# Patient Record
Sex: MALE | Race: BLACK OR AFRICAN AMERICAN | NOT HISPANIC OR LATINO | Employment: FULL TIME | URBAN - METROPOLITAN AREA
[De-identification: names, ages, dates, MRNs, and addresses within clinical notes are randomized per-mention and may not be internally consistent; named-entity substitution may affect disease eponyms.]

---

## 2023-02-02 PROBLEM — L98.9 SKIN LESION: Status: ACTIVE | Noted: 2023-02-02

## 2023-02-02 PROBLEM — I73.9 CLAUDICATION, INTERMITTENT (CMS-HCC): Status: ACTIVE | Noted: 2023-02-02

## 2023-02-02 PROBLEM — I26.99 PULMONARY EMBOLISM, BILATERAL (MULTI): Status: ACTIVE | Noted: 2023-02-02

## 2023-02-02 PROBLEM — I10 HYPERTENSION: Status: ACTIVE | Noted: 2023-02-02

## 2023-02-02 PROBLEM — I82.503: Status: ACTIVE | Noted: 2023-02-02

## 2023-02-02 PROBLEM — I87.2 VENOUS INSUFFICIENCY: Status: ACTIVE | Noted: 2023-02-02

## 2023-02-02 PROBLEM — I87.1 ILIAC VEIN STENOSIS, LEFT: Status: ACTIVE | Noted: 2023-02-02

## 2023-02-02 PROBLEM — E66.9 CLASS 2 OBESITY WITH BODY MASS INDEX (BMI) OF 36.0 TO 36.9 IN ADULT: Status: ACTIVE | Noted: 2023-02-02

## 2023-02-02 PROBLEM — R94.31 ABNORMAL EKG: Status: ACTIVE | Noted: 2023-02-02

## 2023-02-02 PROBLEM — E66.812 CLASS 2 OBESITY WITH BODY MASS INDEX (BMI) OF 36.0 TO 36.9 IN ADULT: Status: ACTIVE | Noted: 2023-02-02

## 2023-02-02 PROBLEM — R52 PAIN: Status: ACTIVE | Noted: 2023-02-02

## 2023-02-02 PROBLEM — Z04.9 CONDITION NOT FOUND: Status: ACTIVE | Noted: 2023-02-02

## 2023-02-02 PROBLEM — R60.9 EDEMA: Status: ACTIVE | Noted: 2023-02-02

## 2023-02-02 RX ORDER — AMLODIPINE BESYLATE 5 MG/1
1 TABLET ORAL DAILY
COMMUNITY
Start: 2022-07-07 | End: 2023-11-13

## 2023-02-02 RX ORDER — WARFARIN SODIUM 5 MG/1
TABLET ORAL
COMMUNITY
Start: 2022-04-11 | End: 2024-03-12 | Stop reason: SDUPTHER

## 2023-02-02 RX ORDER — HYDROCHLOROTHIAZIDE 25 MG/1
1 TABLET ORAL DAILY
COMMUNITY
End: 2024-02-07

## 2023-02-02 RX ORDER — METHOCARBAMOL 750 MG/1
1 TABLET, FILM COATED ORAL NIGHTLY
COMMUNITY
Start: 2022-05-09 | End: 2023-06-16 | Stop reason: SDUPTHER

## 2023-02-02 RX ORDER — NIACIN (INOSITOL NIACINATE) 400(500MG)
1 CAPSULE ORAL 2 TIMES DAILY
COMMUNITY
Start: 2022-12-10 | End: 2023-06-16 | Stop reason: ALTCHOICE

## 2023-03-16 ENCOUNTER — OFFICE VISIT (OUTPATIENT)
Dept: PRIMARY CARE | Facility: CLINIC | Age: 48
End: 2023-03-16
Payer: COMMERCIAL

## 2023-03-16 VITALS
BODY MASS INDEX: 35.87 KG/M2 | WEIGHT: 242.2 LBS | HEIGHT: 69 IN | SYSTOLIC BLOOD PRESSURE: 146 MMHG | DIASTOLIC BLOOD PRESSURE: 92 MMHG | HEART RATE: 78 BPM | OXYGEN SATURATION: 97 %

## 2023-03-16 DIAGNOSIS — R73.9 HYPERGLYCEMIA: ICD-10-CM

## 2023-03-16 DIAGNOSIS — D75.1 POLYCYTHEMIA: ICD-10-CM

## 2023-03-16 DIAGNOSIS — R06.83 SNORING: ICD-10-CM

## 2023-03-16 DIAGNOSIS — I73.9 CLAUDICATION, INTERMITTENT (CMS-HCC): ICD-10-CM

## 2023-03-16 DIAGNOSIS — Z12.11 SCREENING FOR COLON CANCER: ICD-10-CM

## 2023-03-16 DIAGNOSIS — I10 HYPERTENSION, UNSPECIFIED TYPE: Primary | ICD-10-CM

## 2023-03-16 DIAGNOSIS — I82.503 CHRONIC VENOUS EMBOLISM AND THROMBOSIS OF DEEP VESSELS OF BOTH LOWER EXTREMITIES (MULTI): ICD-10-CM

## 2023-03-16 PROBLEM — I26.99 PULMONARY EMBOLISM, BILATERAL (MULTI): Status: RESOLVED | Noted: 2023-02-02 | Resolved: 2023-03-16

## 2023-03-16 PROBLEM — E66.812 CLASS 2 OBESITY WITH BODY MASS INDEX (BMI) OF 36.0 TO 36.9 IN ADULT: Status: RESOLVED | Noted: 2023-02-02 | Resolved: 2023-03-16

## 2023-03-16 PROBLEM — E66.9 CLASS 2 OBESITY WITH BODY MASS INDEX (BMI) OF 36.0 TO 36.9 IN ADULT: Status: RESOLVED | Noted: 2023-02-02 | Resolved: 2023-03-16

## 2023-03-16 PROCEDURE — 99214 OFFICE O/P EST MOD 30 MIN: CPT | Performed by: PHYSICIAN ASSISTANT

## 2023-03-16 PROCEDURE — 3080F DIAST BP >= 90 MM HG: CPT | Performed by: PHYSICIAN ASSISTANT

## 2023-03-16 PROCEDURE — 3077F SYST BP >= 140 MM HG: CPT | Performed by: PHYSICIAN ASSISTANT

## 2023-03-16 PROCEDURE — 1036F TOBACCO NON-USER: CPT | Performed by: PHYSICIAN ASSISTANT

## 2023-03-16 ASSESSMENT — PATIENT HEALTH QUESTIONNAIRE - PHQ9
1. LITTLE INTEREST OR PLEASURE IN DOING THINGS: NOT AT ALL
SUM OF ALL RESPONSES TO PHQ9 QUESTIONS 1 AND 2: 0
2. FEELING DOWN, DEPRESSED OR HOPELESS: NOT AT ALL

## 2023-03-16 NOTE — ASSESSMENT & PLAN NOTE
Continue warfarin 5 mg.  Follow with hematology for PT/INR checks.  Report any abnormal bleeding or bruising.

## 2023-03-16 NOTE — ASSESSMENT & PLAN NOTE
BP above goal of <130/80.  Increase amlodipine from 2.5 mg to 5 mg.  Encourage strict DASH diet and exercise as tolerated.  Encourage patient to buy Omron BP cuff and begin checking at home twice daily.  Follow with cardiology

## 2023-03-16 NOTE — PROGRESS NOTES
"Subjective   Patient ID: Brett Carrasco is a 47 y.o. male who presents for Follow-up (States dx with polycythemia).    HPI 47 year old male presenting for establishment of care. Former patient of colleague, Dr Ledbetter, last seen 12/9/2022.     HTN: Managed with amlodipine 2.5mg and hydrochlorothiazide 25mg. Does not take BP at home. Denies headaches, dizziness, CP, SOB/VILLEGAS, leg swelling. Follows with cardiology, Dr Barboza. Last seen 12/28/23.    C/o of intermittent L upper side pain that is sharp in nature. States it lasts a few seconds. Denies known triggers.     Intermittent claudication/venous insufficiency: Hx of chronic DVT of b/l extremities and PEs. Managed with warfarin 5mg and Coenzyme Q-10. Previously intolerant to Cilostazol 100mg BID d/t headaches. Denies calf pain, redness, swelling, or abnormal bleeding. Last INR 3.1 (1/13/23). States methocarbamol helps with leg cramping and pain. Follows with cardiology and hematology.     Knee pain: Daily R medial knee pain x 1.5 months. Mostly aching in nature, has been sharp before. Worse with walking. Denies redness, swelling, warmth, recent injury.    Requesting sleep study: Known to snore. Has not been told he stops breathing his sleeps. Denies excessive daytime sleepiness. Recently diagnosed with polycythemia. Hematologist recommends sleep study to investigate causes of polycythemia. Patient also states he was told during a hospital stay he has sleep apnea.      Health maintenance  Immunizations:  -Flu: Refused  -Covid: received 2 dose   PSA: UTD (last 9/1/22)  Colon cancer screening: [facility] and [facility]    Objective   BP (!) 146/92   Pulse 78   Ht 1.753 m (5' 9\")   Wt 110 kg (242 lb 3.2 oz)   SpO2 97%   BMI 35.77 kg/m²     Physical Exam  Vitals reviewed.   Constitutional:       General: He is not in acute distress.     Appearance: Normal appearance. He is not ill-appearing.   HENT:      Head: Normocephalic and atraumatic.   Eyes:      General: No scleral " icterus.     Extraocular Movements: Extraocular movements intact.      Conjunctiva/sclera: Conjunctivae normal.      Pupils: Pupils are equal, round, and reactive to light.   Cardiovascular:      Rate and Rhythm: Normal rate and regular rhythm.      Heart sounds: Normal heart sounds. No murmur heard.     No friction rub. No gallop.   Pulmonary:      Effort: Pulmonary effort is normal. No respiratory distress.      Breath sounds: Normal breath sounds. No stridor. No wheezing, rhonchi or rales.   Musculoskeletal:      Cervical back: Normal range of motion.      Right lower leg: No edema.      Left lower leg: No edema.   Skin:     General: Skin is warm and dry.   Neurological:      Mental Status: He is alert and oriented to person, place, and time. Mental status is at baseline.      Cranial Nerves: No cranial nerve deficit.      Gait: Gait normal.   Psychiatric:         Mood and Affect: Mood normal.         Behavior: Behavior normal.         Assessment/Plan   Problem List Items Addressed This Visit       Chronic venous embolism and thrombosis of deep vessels of both lower extremities (CMS/HCC)     Continue warfarin 5 mg.  Follow with hematology for PT/INR checks.  Report any abnormal bleeding or bruising.         Claudication, intermittent (CMS/HCC)     Continue CoQ-10. Follow with vascular surgery.          Relevant Orders    Lipid panel    Hypertension - Primary     BP above goal of <130/80.  Increase amlodipine from 2.5 mg to 5 mg.  Encourage strict DASH diet and exercise as tolerated.  Encourage patient to buy Omron BP cuff and begin checking at home twice daily.  Follow with cardiology         Polycythemia     Continue following with hematology.  Will order sleep study as requested.  Labs per hematology.  Encouraged deep breathing exercises and increase water intake.         Relevant Orders    In-Center Sleep Study (Non-Sleep Provider Only)     Other Visit Diagnoses       Hyperglycemia        Relevant Orders     Hemoglobin A1c    Snoring        Relevant Orders    In-Center Sleep Study (Non-Sleep Provider Only)    Screening for colon cancer        Relevant Orders    Colonoscopy

## 2023-03-16 NOTE — ASSESSMENT & PLAN NOTE
Continue following with hematology.  Will order sleep study as requested.  Labs per hematology.  Encouraged deep breathing exercises and increase water intake.

## 2023-03-29 ENCOUNTER — LAB (OUTPATIENT)
Dept: LAB | Facility: LAB | Age: 48
End: 2023-03-29
Payer: COMMERCIAL

## 2023-03-29 DIAGNOSIS — R73.9 HYPERGLYCEMIA: ICD-10-CM

## 2023-03-29 DIAGNOSIS — I73.9 CLAUDICATION, INTERMITTENT (CMS-HCC): ICD-10-CM

## 2023-03-29 PROCEDURE — 80061 LIPID PANEL: CPT

## 2023-03-29 PROCEDURE — 83036 HEMOGLOBIN GLYCOSYLATED A1C: CPT

## 2023-03-29 PROCEDURE — 36415 COLL VENOUS BLD VENIPUNCTURE: CPT

## 2023-03-30 LAB
CHOLESTEROL (MG/DL) IN SER/PLAS: 217 MG/DL (ref 0–199)
CHOLESTEROL IN HDL (MG/DL) IN SER/PLAS: 38.1 MG/DL
CHOLESTEROL/HDL RATIO: 5.7
ESTIMATED AVERAGE GLUCOSE FOR HBA1C: 174 MG/DL
HEMOGLOBIN A1C/HEMOGLOBIN TOTAL IN BLOOD: 7.7 %
LDL: 133 MG/DL (ref 0–99)
NON HDL CHOLESTEROL: 179 MG/DL
TRIGLYCERIDE (MG/DL) IN SER/PLAS: 232 MG/DL (ref 0–149)
VLDL: 46 MG/DL (ref 0–40)

## 2023-03-31 NOTE — RESULT ENCOUNTER NOTE
Labs show:  Lipid panel shows borderline high total cholesterol and LDL (bad cholesterol), high triglycerides, and low HDL (good cholesterol).  Please verify if he is taking any statin medications for his cholesterol.  If not, we should consider starting one.     Hemoglobin A1c increased from 6.4% to 7.7%.  He needs to begin a diabetic medication. I would like to call over prescription for Farxiga to the pharmacy. If it is not covered they will notify the office and then we will determine a more affordable option.  Possible side effects with this medication are urinary tract infections, yeast infections, GI upset, dehydration.  As always, he needs to cut back on any carbohydrates or sugary sweets/drinks in his diet.  Increase exercise level as tolerated

## 2023-04-03 DIAGNOSIS — E78.5 HYPERLIPIDEMIA, UNSPECIFIED HYPERLIPIDEMIA TYPE: ICD-10-CM

## 2023-04-03 DIAGNOSIS — E11.9 TYPE 2 DIABETES MELLITUS WITHOUT COMPLICATION, WITHOUT LONG-TERM CURRENT USE OF INSULIN (MULTI): ICD-10-CM

## 2023-04-03 DIAGNOSIS — M10.9 GOUT, UNSPECIFIED CAUSE, UNSPECIFIED CHRONICITY, UNSPECIFIED SITE: Primary | ICD-10-CM

## 2023-04-03 RX ORDER — METFORMIN HYDROCHLORIDE 500 MG/1
500 TABLET ORAL
Qty: 60 TABLET | Refills: 2 | Status: SHIPPED | OUTPATIENT
Start: 2023-04-03 | End: 2023-04-28

## 2023-04-03 RX ORDER — ALLOPURINOL 100 MG/1
100 TABLET ORAL DAILY
Qty: 30 TABLET | Refills: 2 | Status: SHIPPED | OUTPATIENT
Start: 2023-04-03 | End: 2023-04-28

## 2023-04-03 RX ORDER — ATORVASTATIN CALCIUM 20 MG/1
20 TABLET, FILM COATED ORAL DAILY
Qty: 30 TABLET | Refills: 2 | Status: SHIPPED | OUTPATIENT
Start: 2023-04-03 | End: 2023-06-16 | Stop reason: ALTCHOICE

## 2023-04-04 ENCOUNTER — TELEPHONE (OUTPATIENT)
Dept: PRIMARY CARE | Facility: CLINIC | Age: 48
End: 2023-04-04
Payer: COMMERCIAL

## 2023-04-04 NOTE — TELEPHONE ENCOUNTER
Pt informed of results.    ----- Message from Trina Adam PA-C sent at 3/31/2023  2:55 PM EDT -----  Labs show:  Lipid panel shows borderline high total cholesterol and LDL (bad cholesterol), high triglycerides, and low HDL (good cholesterol).  Please verify if he is taking any statin medications for his cholesterol.  If not, we should consider starting one.     Hemoglobin A1c increased from 6.4% to 7.7%.  He needs to begin a diabetic medication. I would like to call over prescription for Farxiga to the pharmacy. If it is not covered they will notify the office and then we will determine a more affordable option.  Possible side effects with this medication are urinary tract infections, yeast infections, GI upset, dehydration.  As always, he needs to cut back on any carbohydrates or sugary sweets/drinks in his diet.  Increase exercise level as tolerated

## 2023-04-28 DIAGNOSIS — M10.9 GOUT, UNSPECIFIED CAUSE, UNSPECIFIED CHRONICITY, UNSPECIFIED SITE: ICD-10-CM

## 2023-04-28 DIAGNOSIS — E11.9 TYPE 2 DIABETES MELLITUS WITHOUT COMPLICATION, WITHOUT LONG-TERM CURRENT USE OF INSULIN (MULTI): ICD-10-CM

## 2023-04-28 RX ORDER — ALLOPURINOL 100 MG/1
100 TABLET ORAL DAILY
Qty: 30 TABLET | Refills: 2 | Status: SHIPPED | OUTPATIENT
Start: 2023-04-28 | End: 2023-07-24

## 2023-04-28 RX ORDER — METFORMIN HYDROCHLORIDE 500 MG/1
TABLET ORAL
Qty: 60 TABLET | Refills: 2 | Status: SHIPPED | OUTPATIENT
Start: 2023-04-28 | End: 2023-06-16 | Stop reason: SDUPTHER

## 2023-05-10 ENCOUNTER — HOSPITAL ENCOUNTER (OUTPATIENT)
Dept: DATA CONVERSION | Facility: HOSPITAL | Age: 48
End: 2023-05-10
Attending: STUDENT IN AN ORGANIZED HEALTH CARE EDUCATION/TRAINING PROGRAM | Admitting: STUDENT IN AN ORGANIZED HEALTH CARE EDUCATION/TRAINING PROGRAM
Payer: COMMERCIAL

## 2023-05-10 DIAGNOSIS — D12.0 BENIGN NEOPLASM OF CECUM: ICD-10-CM

## 2023-05-10 DIAGNOSIS — Z12.11 ENCOUNTER FOR SCREENING FOR MALIGNANT NEOPLASM OF COLON: ICD-10-CM

## 2023-05-10 DIAGNOSIS — K57.30 DIVERTICULOSIS OF LARGE INTESTINE WITHOUT PERFORATION OR ABSCESS WITHOUT BLEEDING: ICD-10-CM

## 2023-05-10 LAB — POCT GLUCOSE: 130 MG/DL (ref 74–99)

## 2023-05-16 LAB
COMPLETE PATHOLOGY REPORT: NORMAL
CONVERTED CLINICAL DIAGNOSIS-HISTORY: NORMAL
CONVERTED FINAL DIAGNOSIS: NORMAL
CONVERTED FINAL REPORT PDF LINK TO COPY AND PASTE: NORMAL
CONVERTED GROSS DESCRIPTION: NORMAL

## 2023-06-14 ENCOUNTER — LAB (OUTPATIENT)
Dept: LAB | Facility: LAB | Age: 48
End: 2023-06-14
Payer: COMMERCIAL

## 2023-06-14 DIAGNOSIS — E78.5 HYPERLIPIDEMIA, UNSPECIFIED HYPERLIPIDEMIA TYPE: ICD-10-CM

## 2023-06-14 LAB
ALANINE AMINOTRANSFERASE (SGPT) (U/L) IN SER/PLAS: 21 U/L (ref 10–52)
ALBUMIN (G/DL) IN SER/PLAS: 4.6 G/DL (ref 3.4–5)
ALKALINE PHOSPHATASE (U/L) IN SER/PLAS: 55 U/L (ref 33–120)
ASPARTATE AMINOTRANSFERASE (SGOT) (U/L) IN SER/PLAS: 18 U/L (ref 9–39)
BILIRUBIN DIRECT (MG/DL) IN SER/PLAS: 0.1 MG/DL (ref 0–0.3)
BILIRUBIN TOTAL (MG/DL) IN SER/PLAS: 0.6 MG/DL (ref 0–1.2)
CHOLESTEROL (MG/DL) IN SER/PLAS: 205 MG/DL (ref 0–199)
CHOLESTEROL IN HDL (MG/DL) IN SER/PLAS: 36.6 MG/DL
CHOLESTEROL/HDL RATIO: 5.6
LDL: 121 MG/DL (ref 0–99)
NON HDL CHOLESTEROL: 168 MG/DL
PROTEIN TOTAL: 7.7 G/DL (ref 6.4–8.2)
TRIGLYCERIDE (MG/DL) IN SER/PLAS: 238 MG/DL (ref 0–149)
VLDL: 48 MG/DL (ref 0–40)

## 2023-06-14 PROCEDURE — 36415 COLL VENOUS BLD VENIPUNCTURE: CPT

## 2023-06-14 PROCEDURE — 80061 LIPID PANEL: CPT

## 2023-06-14 PROCEDURE — 80076 HEPATIC FUNCTION PANEL: CPT

## 2023-06-15 NOTE — RESULT ENCOUNTER NOTE
Lab results are back.    Lipid panel shows high total cholesterol, LDL, and triglycerides.  Good cholesterol level is also low.  Please confirm that he is taking atorvastatin 20 mg consistently.  If so, I recommend increasing it to 40 mg to see if there is a better improvement in his cholesterol.  Additionally, he should cut back on any saturated fats, bread, potatoes, pastas.  Increase exercise level to 30 minutes daily as tolerated    Liver enzymes normal

## 2023-06-16 ENCOUNTER — OFFICE VISIT (OUTPATIENT)
Dept: PRIMARY CARE | Facility: CLINIC | Age: 48
End: 2023-06-16
Payer: COMMERCIAL

## 2023-06-16 VITALS
SYSTOLIC BLOOD PRESSURE: 119 MMHG | BODY MASS INDEX: 34.56 KG/M2 | OXYGEN SATURATION: 98 % | DIASTOLIC BLOOD PRESSURE: 80 MMHG | WEIGHT: 234 LBS | HEART RATE: 67 BPM

## 2023-06-16 DIAGNOSIS — I10 PRIMARY HYPERTENSION: Primary | ICD-10-CM

## 2023-06-16 DIAGNOSIS — H60.501 ACUTE OTITIS EXTERNA OF RIGHT EAR, UNSPECIFIED TYPE: ICD-10-CM

## 2023-06-16 DIAGNOSIS — M10.9 GOUT, UNSPECIFIED CAUSE, UNSPECIFIED CHRONICITY, UNSPECIFIED SITE: ICD-10-CM

## 2023-06-16 DIAGNOSIS — E11.9 TYPE 2 DIABETES MELLITUS WITHOUT COMPLICATION, WITHOUT LONG-TERM CURRENT USE OF INSULIN (MULTI): ICD-10-CM

## 2023-06-16 DIAGNOSIS — R25.2 MUSCLE CRAMPS: ICD-10-CM

## 2023-06-16 PROBLEM — E66.811 OBESITY, CLASS I, BMI 30-34.9: Status: ACTIVE | Noted: 2021-07-14

## 2023-06-16 PROBLEM — D68.62 LUPUS ANTICOAGULANT DISORDER (MULTI): Chronic | Status: ACTIVE | Noted: 2021-07-13

## 2023-06-16 PROBLEM — F17.200 NICOTINE USE DISORDER: Status: ACTIVE | Noted: 2021-07-14

## 2023-06-16 PROBLEM — E66.9 OBESITY, CLASS I, BMI 30-34.9: Status: ACTIVE | Noted: 2021-07-14

## 2023-06-16 PROBLEM — R07.89 OTHER CHEST PAIN: Status: ACTIVE | Noted: 2021-07-13

## 2023-06-16 PROBLEM — Z86.718 HISTORY OF DVT OF LOWER EXTREMITY: Status: ACTIVE | Noted: 2021-07-13

## 2023-06-16 PROBLEM — E66.812 OBESITY, CLASS II, BMI 35-39.9: Status: ACTIVE | Noted: 2021-11-29

## 2023-06-16 PROBLEM — E66.9 OBESITY, CLASS II, BMI 35-39.9: Status: ACTIVE | Noted: 2021-11-29

## 2023-06-16 PROBLEM — I27.82 CHRONIC PULMONARY EMBOLISM WITHOUT ACUTE COR PULMONALE (MULTI): Chronic | Status: ACTIVE | Noted: 2021-07-13

## 2023-06-16 PROCEDURE — 3051F HG A1C>EQUAL 7.0%<8.0%: CPT | Performed by: PHYSICIAN ASSISTANT

## 2023-06-16 PROCEDURE — 1036F TOBACCO NON-USER: CPT | Performed by: PHYSICIAN ASSISTANT

## 2023-06-16 PROCEDURE — 3074F SYST BP LT 130 MM HG: CPT | Performed by: PHYSICIAN ASSISTANT

## 2023-06-16 PROCEDURE — 3079F DIAST BP 80-89 MM HG: CPT | Performed by: PHYSICIAN ASSISTANT

## 2023-06-16 PROCEDURE — 99214 OFFICE O/P EST MOD 30 MIN: CPT | Performed by: PHYSICIAN ASSISTANT

## 2023-06-16 RX ORDER — CLOTRIMAZOLE AND BETAMETHASONE DIPROPIONATE 10; .5 MG/ML; MG/ML
LOTION TOPICAL
COMMUNITY
Start: 2014-04-12 | End: 2023-06-16 | Stop reason: ALTCHOICE

## 2023-06-16 RX ORDER — AMLODIPINE BESYLATE 2.5 MG/1
1 TABLET ORAL DAILY
COMMUNITY
Start: 2022-09-13 | End: 2023-06-16 | Stop reason: ALTCHOICE

## 2023-06-16 RX ORDER — FLUCONAZOLE 150 MG/1
TABLET ORAL
COMMUNITY
Start: 2014-04-12 | End: 2023-06-16 | Stop reason: ALTCHOICE

## 2023-06-16 RX ORDER — COLCHICINE 0.6 MG/1
0.6 TABLET ORAL DAILY
COMMUNITY
Start: 2023-04-27 | End: 2023-06-16 | Stop reason: ALTCHOICE

## 2023-06-16 RX ORDER — METHOCARBAMOL 750 MG/1
750 TABLET, FILM COATED ORAL NIGHTLY
Qty: 90 TABLET | Refills: 1 | Status: SHIPPED | OUTPATIENT
Start: 2023-06-16 | End: 2024-04-24

## 2023-06-16 RX ORDER — ENOXAPARIN SODIUM 100 MG/ML
INJECTION SUBCUTANEOUS
COMMUNITY
Start: 2023-04-27 | End: 2023-06-16 | Stop reason: ALTCHOICE

## 2023-06-16 RX ORDER — ATORVASTATIN CALCIUM 10 MG/1
10 TABLET, FILM COATED ORAL
COMMUNITY
End: 2023-06-16 | Stop reason: ALTCHOICE

## 2023-06-16 RX ORDER — NEOMYCIN SULFATE, POLYMYXIN B SULFATE, HYDROCORTISONE 3.5; 10000; 1 MG/ML; [USP'U]/ML; MG/ML
2 SOLUTION/ DROPS AURICULAR (OTIC) 4 TIMES DAILY
Qty: 10 ML | Refills: 0 | Status: SHIPPED | OUTPATIENT
Start: 2023-06-16 | End: 2023-10-31 | Stop reason: SDUPTHER

## 2023-06-16 RX ORDER — ACETAMINOPHEN 160 MG/5ML
200 SUSPENSION, ORAL (FINAL DOSE FORM) ORAL
COMMUNITY

## 2023-06-16 RX ORDER — HYDROCODONE BITARTRATE AND ACETAMINOPHEN 5; 325 MG/1; MG/1
1 TABLET ORAL
COMMUNITY
End: 2023-06-16 | Stop reason: ALTCHOICE

## 2023-06-16 RX ORDER — METHOCARBAMOL 750 MG/1
750 TABLET, FILM COATED ORAL EVERY 8 HOURS PRN
COMMUNITY
Start: 2022-06-30 | End: 2023-06-16

## 2023-06-16 RX ORDER — METFORMIN HYDROCHLORIDE 500 MG/1
500 TABLET ORAL
Qty: 180 TABLET | Refills: 0 | Status: SHIPPED | OUTPATIENT
Start: 2023-06-16 | End: 2023-11-13 | Stop reason: SDUPTHER

## 2023-06-16 NOTE — PROGRESS NOTES
Subjective   Brett Carrasco is a 47 y.o. male who presents for 3month f/u and ear, pain, refill on pain med.  HPI Brett Carrasco is a 47 y.o. male presenting for a follow up. Generally doing well. Currently c/o:    Preauricular pain: tried to clean his ears with peroxide and now has irritation of the right ear. No tinnitus or hearing changes. No pain with manipulation of the auricle. No discharge from the ear.     Muscle cramping: chronic. Was told by hematologist and cardiologist that it is 2/2 to venous stasis, PAD (w/ claudication), polycythemia, and lupus anticoagulant.  Doesn't do any topical pain relief. Methocarbamol helps the most. Resolve w/in 15 mins of taking    HTN, HLD: Managed with amlodipine 5mg and hydrochlorothiazide 25mg. Does not take BP at home. Denies headaches, dizziness, CP, SOB/VILLEGAS, leg swelling. Follows with cardiology, appt in 1 week.     T2DM: started on Metformin 500mg twice daily, tolerating it well without side effects. He does not check BG levels at home.     Gout: on allopurinol 100mg, stable. No gout flares since initiation of allopurinol.     12 point ROS reviewed and negative other than as stated in HPI    /80   Pulse 67   Wt 106 kg (234 lb)   SpO2 98%   BMI 34.56 kg/m²   Objective   Physical Exam  Vitals reviewed.   Constitutional:       General: He is not in acute distress.     Appearance: Normal appearance. He is not ill-appearing.   HENT:      Head: Normocephalic and atraumatic.      Right Ear: Tympanic membrane normal. Swelling (of the EAC) present. No tenderness. No middle ear effusion. There is no impacted cerumen. No foreign body. No mastoid tenderness.      Ears:      Comments: Erythema of the right EAC.   Eyes:      General: No scleral icterus.     Extraocular Movements: Extraocular movements intact.      Conjunctiva/sclera: Conjunctivae normal.      Pupils: Pupils are equal, round, and reactive to light.   Cardiovascular:      Rate and Rhythm: Normal rate and regular  rhythm.      Heart sounds: Normal heart sounds. No murmur heard.     No friction rub. No gallop.   Pulmonary:      Effort: Pulmonary effort is normal. No respiratory distress.      Breath sounds: Normal breath sounds. No stridor. No wheezing, rhonchi or rales.   Musculoskeletal:      Cervical back: Normal range of motion.      Right lower leg: No edema.      Left lower leg: No edema.   Skin:     General: Skin is warm and dry.   Neurological:      Mental Status: He is alert and oriented to person, place, and time. Mental status is at baseline.      Cranial Nerves: No cranial nerve deficit.      Gait: Gait normal.   Psychiatric:         Mood and Affect: Mood normal.         Behavior: Behavior normal.         Assessment/Plan   Problem List Items Addressed This Visit       Hypertension - Primary     Well controlled. Continue current regimen unchanged. Follow DASH diet and exercise as tolerated.          Muscle cramps     Continue to use methocarbamol as needed. Magnesium level ordered. Use topical bengay or lidocaine patches.          Relevant Medications    methocarbamol (Robaxin) 750 mg tablet    Other Relevant Orders    Magnesium    Gout     Continue allopurinol as prescribed         Relevant Orders    Uric acid    Type 2 diabetes mellitus without complication, without long-term current use of insulin (CMS/[facility])     Continue metformin 500mg unchanged. Consider initiation of trulicity. Follow a strict ADA diet and exercise as tolerated         Relevant Medications    metFORMIN (Glucophage) 500 mg tablet    Other Relevant Orders    Hemoglobin A1c    Acute otitis externa of right ear     Begin using cortisporin otic drops as directed in the right ear         Relevant Medications    neomycin-polymyxin-HC (Cortisporin) otic solution        Follow up in 3-4 months or sooner as needed

## 2023-06-18 PROBLEM — M10.9 GOUT: Status: ACTIVE | Noted: 2023-06-18

## 2023-06-18 PROBLEM — E11.9 TYPE 2 DIABETES MELLITUS WITHOUT COMPLICATION, WITHOUT LONG-TERM CURRENT USE OF INSULIN (MULTI): Status: ACTIVE | Noted: 2023-06-18

## 2023-06-18 PROBLEM — H60.501 ACUTE OTITIS EXTERNA OF RIGHT EAR: Status: ACTIVE | Noted: 2023-06-18

## 2023-06-18 PROBLEM — R25.2 MUSCLE CRAMPS: Status: ACTIVE | Noted: 2023-06-18

## 2023-06-19 NOTE — ASSESSMENT & PLAN NOTE
Continue metformin 500mg unchanged. Consider initiation of trulicity. Follow a strict ADA diet and exercise as tolerated

## 2023-06-19 NOTE — ASSESSMENT & PLAN NOTE
Continue to use methocarbamol as needed. Magnesium level ordered. Use topical bengay or lidocaine patches.

## 2023-07-23 DIAGNOSIS — M10.9 GOUT, UNSPECIFIED CAUSE, UNSPECIFIED CHRONICITY, UNSPECIFIED SITE: ICD-10-CM

## 2023-07-24 RX ORDER — ALLOPURINOL 100 MG/1
100 TABLET ORAL DAILY
Qty: 90 TABLET | Refills: 0 | Status: SHIPPED | OUTPATIENT
Start: 2023-07-24 | End: 2023-10-22

## 2023-09-06 PROBLEM — E11.9 DIABETES MELLITUS (MULTI): Status: ACTIVE | Noted: 2023-09-06

## 2023-09-06 PROBLEM — E87.6 HYPOKALEMIA: Status: ACTIVE | Noted: 2021-07-13

## 2023-09-06 PROBLEM — R76.0 LUPUS ANTICOAGULANT POSITIVE: Status: ACTIVE | Noted: 2021-07-13

## 2023-09-06 PROBLEM — L82.1 OTHER SEBORRHEIC KERATOSIS: Status: ACTIVE | Noted: 2022-12-19

## 2023-09-06 PROBLEM — L91.8 OTHER HYPERTROPHIC DISORDERS OF THE SKIN: Status: ACTIVE | Noted: 2022-12-19

## 2023-09-06 PROBLEM — E79.0 HYPERURICEMIA: Status: ACTIVE | Noted: 2023-06-18

## 2023-09-06 PROBLEM — R73.09 OTHER ABNORMAL GLUCOSE: Status: ACTIVE | Noted: 2023-09-06

## 2023-09-06 RX ORDER — METHOCARBAMOL 750 MG/1
750 TABLET, FILM COATED ORAL NIGHTLY PRN
COMMUNITY
Start: 2022-05-09

## 2023-09-06 RX ORDER — ALLOPURINOL 100 MG/1
1 TABLET ORAL DAILY
COMMUNITY
Start: 2023-06-21 | End: 2023-10-31 | Stop reason: SDUPTHER

## 2023-09-06 RX ORDER — NIACIN (INOSITOL NIACINATE) 400(500MG)
1 CAPSULE ORAL 2 TIMES DAILY
COMMUNITY
Start: 2022-12-10

## 2023-09-18 ENCOUNTER — OFFICE VISIT (OUTPATIENT)
Dept: PRIMARY CARE | Facility: CLINIC | Age: 48
End: 2023-09-18
Payer: COMMERCIAL

## 2023-09-18 VITALS
BODY MASS INDEX: 35.1 KG/M2 | DIASTOLIC BLOOD PRESSURE: 87 MMHG | SYSTOLIC BLOOD PRESSURE: 132 MMHG | RESPIRATION RATE: 19 BRPM | WEIGHT: 237 LBS | HEIGHT: 69 IN | TEMPERATURE: 97 F | HEART RATE: 90 BPM | OXYGEN SATURATION: 98 %

## 2023-09-18 DIAGNOSIS — I10 PRIMARY HYPERTENSION: ICD-10-CM

## 2023-09-18 DIAGNOSIS — E11.9 TYPE 2 DIABETES MELLITUS WITHOUT COMPLICATION, WITHOUT LONG-TERM CURRENT USE OF INSULIN (MULTI): ICD-10-CM

## 2023-09-18 DIAGNOSIS — M10.9 POLYARTICULAR GOUT: ICD-10-CM

## 2023-09-18 DIAGNOSIS — Z86.718 HISTORY OF RECURRENT DEEP VEIN THROMBOSIS (DVT): ICD-10-CM

## 2023-09-18 DIAGNOSIS — R93.89 ABNORMAL X-RAY: Primary | ICD-10-CM

## 2023-09-18 DIAGNOSIS — E55.9 HYPOVITAMINOSIS D: ICD-10-CM

## 2023-09-18 LAB
ALANINE AMINOTRANSFERASE (SGPT) (U/L) IN SER/PLAS: 20 U/L (ref 10–52)
ALBUMIN (G/DL) IN SER/PLAS: 4.5 G/DL (ref 3.4–5)
ALBUMIN (MG/L) IN URINE: <7 MG/L
ALBUMIN/CREATININE (UG/MG) IN URINE: NORMAL UG/MG CRT (ref 0–30)
ALKALINE PHOSPHATASE (U/L) IN SER/PLAS: 57 U/L (ref 33–120)
ANION GAP IN SER/PLAS: 15 MMOL/L (ref 10–20)
ASPARTATE AMINOTRANSFERASE (SGOT) (U/L) IN SER/PLAS: 17 U/L (ref 9–39)
BILIRUBIN TOTAL (MG/DL) IN SER/PLAS: 0.5 MG/DL (ref 0–1.2)
CALCIDIOL (25 OH VITAMIN D3) (NG/ML) IN SER/PLAS: 10 NG/ML
CALCIUM (MG/DL) IN SER/PLAS: 9.4 MG/DL (ref 8.6–10.6)
CARBON DIOXIDE, TOTAL (MMOL/L) IN SER/PLAS: 29 MMOL/L (ref 21–32)
CHLORIDE (MMOL/L) IN SER/PLAS: 100 MMOL/L (ref 98–107)
CHOLESTEROL (MG/DL) IN SER/PLAS: 217 MG/DL (ref 0–199)
CHOLESTEROL IN HDL (MG/DL) IN SER/PLAS: 37.1 MG/DL
CHOLESTEROL/HDL RATIO: 5.8
CREATININE (MG/DL) IN SER/PLAS: 1.17 MG/DL (ref 0.5–1.3)
CREATININE (MG/DL) IN URINE: 76.5 MG/DL (ref 20–370)
ESTIMATED AVERAGE GLUCOSE FOR HBA1C: 131 MG/DL
GFR MALE: 78 ML/MIN/1.73M2
GLUCOSE (MG/DL) IN SER/PLAS: 115 MG/DL (ref 74–99)
HEMOGLOBIN A1C/HEMOGLOBIN TOTAL IN BLOOD: 6.2 %
NON-HDL CHOLESTEROL: 180 MG/DL
POTASSIUM (MMOL/L) IN SER/PLAS: 3.8 MMOL/L (ref 3.5–5.3)
PROTEIN TOTAL: 7.8 G/DL (ref 6.4–8.2)
SODIUM (MMOL/L) IN SER/PLAS: 140 MMOL/L (ref 136–145)
URATE (MG/DL) IN SER/PLAS: 6.4 MG/DL (ref 4–7.5)
UREA NITROGEN (MG/DL) IN SER/PLAS: 15 MG/DL (ref 6–23)

## 2023-09-18 PROCEDURE — 82570 ASSAY OF URINE CREATININE: CPT

## 2023-09-18 PROCEDURE — 82465 ASSAY BLD/SERUM CHOLESTEROL: CPT

## 2023-09-18 PROCEDURE — 83036 HEMOGLOBIN GLYCOSYLATED A1C: CPT

## 2023-09-18 PROCEDURE — 83718 ASSAY OF LIPOPROTEIN: CPT

## 2023-09-18 PROCEDURE — 82043 UR ALBUMIN QUANTITATIVE: CPT

## 2023-09-18 PROCEDURE — 3079F DIAST BP 80-89 MM HG: CPT | Performed by: INTERNAL MEDICINE

## 2023-09-18 PROCEDURE — 99214 OFFICE O/P EST MOD 30 MIN: CPT | Performed by: INTERNAL MEDICINE

## 2023-09-18 PROCEDURE — 82306 VITAMIN D 25 HYDROXY: CPT

## 2023-09-18 PROCEDURE — 3075F SYST BP GE 130 - 139MM HG: CPT | Performed by: INTERNAL MEDICINE

## 2023-09-18 PROCEDURE — 3044F HG A1C LEVEL LT 7.0%: CPT | Performed by: INTERNAL MEDICINE

## 2023-09-18 PROCEDURE — 84550 ASSAY OF BLOOD/URIC ACID: CPT

## 2023-09-18 PROCEDURE — 1036F TOBACCO NON-USER: CPT | Performed by: INTERNAL MEDICINE

## 2023-09-18 PROCEDURE — 80053 COMPREHEN METABOLIC PANEL: CPT

## 2023-09-18 SDOH — ECONOMIC STABILITY: INCOME INSECURITY: IN THE LAST 12 MONTHS, WAS THERE A TIME WHEN YOU WERE NOT ABLE TO PAY THE MORTGAGE OR RENT ON TIME?: NO

## 2023-09-18 SDOH — ECONOMIC STABILITY: FOOD INSECURITY: WITHIN THE PAST 12 MONTHS, YOU WORRIED THAT YOUR FOOD WOULD RUN OUT BEFORE YOU GOT MONEY TO BUY MORE.: NEVER TRUE

## 2023-09-18 SDOH — ECONOMIC STABILITY: FOOD INSECURITY: WITHIN THE PAST 12 MONTHS, THE FOOD YOU BOUGHT JUST DIDN'T LAST AND YOU DIDN'T HAVE MONEY TO GET MORE.: NEVER TRUE

## 2023-09-18 SDOH — HEALTH STABILITY: PHYSICAL HEALTH: ON AVERAGE, HOW MANY DAYS PER WEEK DO YOU ENGAGE IN MODERATE TO STRENUOUS EXERCISE (LIKE A BRISK WALK)?: 5 DAYS

## 2023-09-18 SDOH — ECONOMIC STABILITY: TRANSPORTATION INSECURITY
IN THE PAST 12 MONTHS, HAS THE LACK OF TRANSPORTATION KEPT YOU FROM MEDICAL APPOINTMENTS OR FROM GETTING MEDICATIONS?: NO

## 2023-09-18 SDOH — HEALTH STABILITY: PHYSICAL HEALTH: ON AVERAGE, HOW MANY MINUTES DO YOU ENGAGE IN EXERCISE AT THIS LEVEL?: 50 MIN

## 2023-09-18 SDOH — ECONOMIC STABILITY: TRANSPORTATION INSECURITY
IN THE PAST 12 MONTHS, HAS LACK OF TRANSPORTATION KEPT YOU FROM MEETINGS, WORK, OR FROM GETTING THINGS NEEDED FOR DAILY LIVING?: NO

## 2023-09-18 SDOH — ECONOMIC STABILITY: HOUSING INSECURITY
IN THE LAST 12 MONTHS, WAS THERE A TIME WHEN YOU DID NOT HAVE A STEADY PLACE TO SLEEP OR SLEPT IN A SHELTER (INCLUDING NOW)?: NO

## 2023-09-18 ASSESSMENT — ENCOUNTER SYMPTOMS
ACTIVITY CHANGE: 0
MYALGIAS: 0
CHILLS: 0
CHEST TIGHTNESS: 0
DEPRESSION: 0
SHORTNESS OF BREATH: 0
WEAKNESS: 0
LOSS OF SENSATION IN FEET: 0
DIARRHEA: 0
SORE THROAT: 0
AGITATION: 0
PALPITATIONS: 0
NAUSEA: 0
SINUS PRESSURE: 0
OCCASIONAL FEELINGS OF UNSTEADINESS: 0

## 2023-09-18 ASSESSMENT — SOCIAL DETERMINANTS OF HEALTH (SDOH)
WITHIN THE LAST YEAR, HAVE YOU BEEN AFRAID OF YOUR PARTNER OR EX-PARTNER?: NO
IN THE PAST 12 MONTHS, HAS THE ELECTRIC, GAS, OIL, OR WATER COMPANY THREATENED TO SHUT OFF SERVICE IN YOUR HOME?: NO
ARE YOU MARRIED, WIDOWED, DIVORCED, SEPARATED, NEVER MARRIED, OR LIVING WITH A PARTNER?: PATIENT DECLINED
IN A TYPICAL WEEK, HOW MANY TIMES DO YOU TALK ON THE PHONE WITH FAMILY, FRIENDS, OR NEIGHBORS?: MORE THAN THREE TIMES A WEEK
HOW OFTEN DO YOU GET TOGETHER WITH FRIENDS OR RELATIVES?: MORE THAN THREE TIMES A WEEK
WITHIN THE LAST YEAR, HAVE YOU BEEN HUMILIATED OR EMOTIONALLY ABUSED IN OTHER WAYS BY YOUR PARTNER OR EX-PARTNER?: NO
DO YOU BELONG TO ANY CLUBS OR ORGANIZATIONS SUCH AS CHURCH GROUPS UNIONS, FRATERNAL OR ATHLETIC GROUPS, OR SCHOOL GROUPS?: PATIENT DECLINED
WITHIN THE LAST YEAR, HAVE TO BEEN RAPED OR FORCED TO HAVE ANY KIND OF SEXUAL ACTIVITY BY YOUR PARTNER OR EX-PARTNER?: NO
HOW OFTEN DO YOU ATTEND CHURCH OR RELIGIOUS SERVICES?: MORE THAN 4 TIMES PER YEAR
HOW OFTEN DO YOU ATTENT MEETINGS OF THE CLUB OR ORGANIZATION YOU BELONG TO?: PATIENT DECLINED
WITHIN THE LAST YEAR, HAVE YOU BEEN KICKED, HIT, SLAPPED, OR OTHERWISE PHYSICALLY HURT BY YOUR PARTNER OR EX-PARTNER?: NO
HOW HARD IS IT FOR YOU TO PAY FOR THE VERY BASICS LIKE FOOD, HOUSING, MEDICAL CARE, AND HEATING?: NOT VERY HARD

## 2023-09-18 ASSESSMENT — LIFESTYLE VARIABLES
SKIP TO QUESTIONS 9-10: 1
HOW OFTEN DO YOU HAVE SIX OR MORE DRINKS ON ONE OCCASION: NEVER
HOW MANY STANDARD DRINKS CONTAINING ALCOHOL DO YOU HAVE ON A TYPICAL DAY: PATIENT DOES NOT DRINK
HOW OFTEN DO YOU HAVE A DRINK CONTAINING ALCOHOL: NEVER
AUDIT-C TOTAL SCORE: 0

## 2023-09-18 ASSESSMENT — ANXIETY QUESTIONNAIRES
7. FEELING AFRAID AS IF SOMETHING AWFUL MIGHT HAPPEN: NOT AT ALL
4. TROUBLE RELAXING: NOT AT ALL
2. NOT BEING ABLE TO STOP OR CONTROL WORRYING: NOT AT ALL
1. FEELING NERVOUS, ANXIOUS, OR ON EDGE: NOT AT ALL
GAD7 TOTAL SCORE: 0
5. BEING SO RESTLESS THAT IT IS HARD TO SIT STILL: NOT AT ALL
6. BECOMING EASILY ANNOYED OR IRRITABLE: NOT AT ALL
3. WORRYING TOO MUCH ABOUT DIFFERENT THINGS: NOT AT ALL
IF YOU CHECKED OFF ANY PROBLEMS ON THIS QUESTIONNAIRE, HOW DIFFICULT HAVE THESE PROBLEMS MADE IT FOR YOU TO DO YOUR WORK, TAKE CARE OF THINGS AT HOME, OR GET ALONG WITH OTHER PEOPLE: NOT DIFFICULT AT ALL

## 2023-09-18 ASSESSMENT — COLUMBIA-SUICIDE SEVERITY RATING SCALE - C-SSRS
6. HAVE YOU EVER DONE ANYTHING, STARTED TO DO ANYTHING, OR PREPARED TO DO ANYTHING TO END YOUR LIFE?: NO
2. HAVE YOU ACTUALLY HAD ANY THOUGHTS OF KILLING YOURSELF?: NO
1. IN THE PAST MONTH, HAVE YOU WISHED YOU WERE DEAD OR WISHED YOU COULD GO TO SLEEP AND NOT WAKE UP?: NO

## 2023-09-18 ASSESSMENT — PAIN SCALES - GENERAL: PAINLEVEL: 0-NO PAIN

## 2023-09-18 ASSESSMENT — PATIENT HEALTH QUESTIONNAIRE - PHQ9
2. FEELING DOWN, DEPRESSED OR HOPELESS: NOT AT ALL
SUM OF ALL RESPONSES TO PHQ9 QUESTIONS 1 AND 2: 0
1. LITTLE INTEREST OR PLEASURE IN DOING THINGS: NOT AT ALL

## 2023-09-18 NOTE — PROGRESS NOTES
"Subjective   Patient ID: Brett Carrasco is a 47 y.o. male who presents for ABNORMAL TESTING FOR LUNGS (NEW PATIENT TO ).  Moved here from [place].  Worked from home in [place] with his wife and family. History of SLE and polycythemia with DVTs on Coumadin.  Tried Zarelto but had a clot form while on this.  He had a calcium score but they saw a nodule in his lung. He needed a PCP to further address this.    HPI     Review of Systems   Constitutional:  Negative for activity change and chills.   HENT:  Negative for congestion, sinus pressure and sore throat.    Respiratory:  Negative for chest tightness and shortness of breath.    Cardiovascular:  Negative for chest pain and palpitations.   Gastrointestinal:  Negative for diarrhea and nausea.   Musculoskeletal:  Negative for myalgias.   Neurological:  Negative for weakness.   Psychiatric/Behavioral:  Negative for agitation and behavioral problems.        Objective   /87 (BP Location: Right arm, Patient Position: Sitting, BP Cuff Size: Large adult)   Pulse 90   Temp 36.1 °C (97 °F) (Temporal)   Resp 19   Ht 1.753 m (5' 9\")   Wt 108 kg (237 lb)   SpO2 98%   BMI 35.00 kg/m²     Physical Exam  Constitutional:       Appearance: Normal appearance.   HENT:      Head: Normocephalic and atraumatic.      Nose: Nose normal.      Mouth/Throat:      Mouth: Mucous membranes are moist.   Eyes:      Extraocular Movements: Extraocular movements intact.      Pupils: Pupils are equal, round, and reactive to light.   Cardiovascular:      Rate and Rhythm: Normal rate and regular rhythm.   Pulmonary:      Effort: No respiratory distress.      Breath sounds: No wheezing.   Abdominal:      General: Bowel sounds are normal.      Palpations: Abdomen is soft.   Neurological:      General: No focal deficit present.         Assessment/Plan   Problem List Items Addressed This Visit       Primary hypertension    Relevant Orders    Comprehensive Metabolic Panel (Completed)    Lipid Panel " Non-Fasting (Completed)    History of recurrent deep vein thrombosis (DVT)    Type 2 diabetes mellitus without complication, without long-term current use of insulin (CMS/HCC)    Relevant Orders    Hemoglobin A1C (Completed)    Albumin , Urine Random (Completed)    Hypovitaminosis D    Relevant Orders    Vitamin D 25-Hydroxy,Total (for eval of Vitamin D levels) (Completed)    Polyarticular gout    Relevant Orders    Uric Acid (Completed)    Abnormal x-ray - Primary    Relevant Orders    CT chest w and wo IV contrast   We will order a follow up CT scan to better visualize the nodule.

## 2023-09-21 VITALS — HEIGHT: 69 IN | WEIGHT: 234.79 LBS | BODY MASS INDEX: 34.78 KG/M2

## 2023-09-21 DIAGNOSIS — D75.1 POLYCYTHEMIA, SECONDARY: Primary | ICD-10-CM

## 2023-09-21 RX ORDER — FAMOTIDINE 10 MG/ML
20 INJECTION INTRAVENOUS ONCE AS NEEDED
Status: CANCELLED | OUTPATIENT
Start: 2023-10-11

## 2023-09-21 RX ORDER — EPINEPHRINE 0.3 MG/.3ML
0.3 INJECTION SUBCUTANEOUS EVERY 5 MIN PRN
Status: CANCELLED | OUTPATIENT
Start: 2023-10-11

## 2023-09-21 RX ORDER — ALBUTEROL SULFATE 0.83 MG/ML
3 SOLUTION RESPIRATORY (INHALATION) AS NEEDED
Status: CANCELLED | OUTPATIENT
Start: 2023-10-11

## 2023-09-21 RX ORDER — HEPARIN SODIUM,PORCINE/PF 10 UNIT/ML
50 SYRINGE (ML) INTRAVENOUS AS NEEDED
Status: CANCELLED | OUTPATIENT
Start: 2023-10-11

## 2023-09-21 RX ORDER — HEPARIN 100 UNIT/ML
500 SYRINGE INTRAVENOUS AS NEEDED
Status: CANCELLED | OUTPATIENT
Start: 2023-10-11

## 2023-09-21 RX ORDER — DIPHENHYDRAMINE HYDROCHLORIDE 50 MG/ML
50 INJECTION INTRAMUSCULAR; INTRAVENOUS AS NEEDED
Status: CANCELLED | OUTPATIENT
Start: 2023-10-11

## 2023-09-25 DIAGNOSIS — D75.1 POLYCYTHEMIA, SECONDARY: Primary | ICD-10-CM

## 2023-10-03 ENCOUNTER — TELEPHONE (OUTPATIENT)
Dept: ADMISSION | Facility: HOSPITAL | Age: 48
End: 2023-10-03
Payer: COMMERCIAL

## 2023-10-03 NOTE — TELEPHONE ENCOUNTER
Na:  The patient cannot get labs outside the  system;  I orders labs after I see the patient, not before.  Thx

## 2023-10-03 NOTE — TELEPHONE ENCOUNTER
The patient was made aware via his VM that Dr. Bullard will order lab work AFTER seeing the patient. He was given the date and time of his apt on the VM and encouraged to call back with any further questions or concerns.

## 2023-10-03 NOTE — TELEPHONE ENCOUNTER
The pt called and left a VM, wants to confirm that the apt he has with Dr. Bullard is in person (which per the schedule, it is), also wants his lab work sent to him so that he can get labs outside of . Message routed to the team

## 2023-10-04 ENCOUNTER — APPOINTMENT (OUTPATIENT)
Dept: RADIOLOGY | Facility: CLINIC | Age: 48
End: 2023-10-04
Payer: COMMERCIAL

## 2023-10-10 DIAGNOSIS — D75.1 POLYCYTHEMIA, SECONDARY: Primary | ICD-10-CM

## 2023-10-11 ENCOUNTER — INFUSION (OUTPATIENT)
Dept: HEMATOLOGY/ONCOLOGY | Facility: HOSPITAL | Age: 48
End: 2023-10-11
Payer: COMMERCIAL

## 2023-10-11 ENCOUNTER — LAB (OUTPATIENT)
Dept: LAB | Facility: HOSPITAL | Age: 48
End: 2023-10-11
Payer: COMMERCIAL

## 2023-10-11 ENCOUNTER — APPOINTMENT (OUTPATIENT)
Dept: LAB | Facility: HOSPITAL | Age: 48
End: 2023-10-11
Payer: COMMERCIAL

## 2023-10-11 ENCOUNTER — OFFICE VISIT (OUTPATIENT)
Dept: HEMATOLOGY/ONCOLOGY | Facility: HOSPITAL | Age: 48
End: 2023-10-11
Payer: COMMERCIAL

## 2023-10-11 VITALS
HEART RATE: 95 BPM | DIASTOLIC BLOOD PRESSURE: 71 MMHG | RESPIRATION RATE: 20 BRPM | SYSTOLIC BLOOD PRESSURE: 112 MMHG | TEMPERATURE: 97.7 F

## 2023-10-11 VITALS
HEIGHT: 69 IN | SYSTOLIC BLOOD PRESSURE: 124 MMHG | RESPIRATION RATE: 20 BRPM | DIASTOLIC BLOOD PRESSURE: 72 MMHG | BODY MASS INDEX: 34.94 KG/M2 | HEART RATE: 90 BPM | TEMPERATURE: 97.7 F | WEIGHT: 235.89 LBS | OXYGEN SATURATION: 99 %

## 2023-10-11 DIAGNOSIS — D75.1 POLYCYTHEMIA, SECONDARY: ICD-10-CM

## 2023-10-11 DIAGNOSIS — D75.1 POLYCYTHEMIA, SECONDARY: Primary | ICD-10-CM

## 2023-10-11 LAB
BASOPHILS # BLD AUTO: 0.04 X10*3/UL (ref 0–0.1)
BASOPHILS NFR BLD AUTO: 0.6 %
EOSINOPHIL # BLD AUTO: 0.33 X10*3/UL (ref 0–0.7)
EOSINOPHIL NFR BLD AUTO: 4.7 %
ERYTHROCYTE [DISTWIDTH] IN BLOOD BY AUTOMATED COUNT: 16.8 % (ref 11.5–14.5)
FERRITIN SERPL-MCNC: 47 NG/ML (ref 20–300)
HCT VFR BLD AUTO: 47.5 % (ref 41–52)
HGB BLD-MCNC: 15.1 G/DL (ref 13.5–17.5)
HOLD SPECIMEN: NORMAL
IMM GRANULOCYTES # BLD AUTO: 0.02 X10*3/UL (ref 0–0.7)
IMM GRANULOCYTES NFR BLD AUTO: 0.3 % (ref 0–0.9)
LYMPHOCYTES # BLD AUTO: 2.21 X10*3/UL (ref 1.2–4.8)
LYMPHOCYTES NFR BLD AUTO: 31.6 %
MCH RBC QN AUTO: 24.7 PG (ref 26–34)
MCHC RBC AUTO-ENTMCNC: 31.8 G/DL (ref 32–36)
MCV RBC AUTO: 78 FL (ref 80–100)
MONOCYTES # BLD AUTO: 0.53 X10*3/UL (ref 0.1–1)
MONOCYTES NFR BLD AUTO: 7.6 %
NEUTROPHILS # BLD AUTO: 3.86 X10*3/UL (ref 1.2–7.7)
NEUTROPHILS NFR BLD AUTO: 55.2 %
NRBC BLD-RTO: 0 /100 WBCS (ref 0–0)
PLATELET # BLD AUTO: 230 X10*3/UL (ref 150–450)
PMV BLD AUTO: 9 FL (ref 7.5–11.5)
RBC # BLD AUTO: 6.12 X10*6/UL (ref 4.5–5.9)
WBC # BLD AUTO: 7 X10*3/UL (ref 4.4–11.3)

## 2023-10-11 PROCEDURE — 3078F DIAST BP <80 MM HG: CPT | Performed by: INTERNAL MEDICINE

## 2023-10-11 PROCEDURE — 99195 PHLEBOTOMY: CPT

## 2023-10-11 PROCEDURE — 3044F HG A1C LEVEL LT 7.0%: CPT | Performed by: INTERNAL MEDICINE

## 2023-10-11 PROCEDURE — 1036F TOBACCO NON-USER: CPT | Performed by: INTERNAL MEDICINE

## 2023-10-11 PROCEDURE — 82728 ASSAY OF FERRITIN: CPT

## 2023-10-11 PROCEDURE — 99215 OFFICE O/P EST HI 40 MIN: CPT | Performed by: INTERNAL MEDICINE

## 2023-10-11 PROCEDURE — 36415 COLL VENOUS BLD VENIPUNCTURE: CPT

## 2023-10-11 PROCEDURE — 3074F SYST BP LT 130 MM HG: CPT | Performed by: INTERNAL MEDICINE

## 2023-10-11 PROCEDURE — 85025 COMPLETE CBC W/AUTO DIFF WBC: CPT

## 2023-10-11 RX ORDER — MULTIVITAMIN
1 TABLET ORAL DAILY
COMMUNITY

## 2023-10-11 ASSESSMENT — PAIN SCALES - GENERAL: PAINLEVEL: 0-NO PAIN

## 2023-10-12 NOTE — PROGRESS NOTES
Patient ID: Brett Carrasco is a 47 y.o. male.  Referring Physician: No referring provider defined for this encounter.  Primary Care Provider: Garrett Lynn MD  Visit Type: Follow Up      Subjective    HPI   HPI:  This is the 3nd visit for this 46 yo male who had multiple DVT and PEs on in , developed a new PE while taking Xarelto 20 mg by mouth daily.  He was first seen here in 2021 by Dr. 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  His anticoagulation was between warfarin or Lovenox, but discontinued the latter due o insurance issues.  Recurrent  clots occurred when off anticoagulation.       His work up is positive for lupus anticoagulant but negative for antiphospholipid antibodies, antithrombin III, protein C and S, factor V leiden and prothrombin gene mutation.  In 2021 on Xarelto he was doing well.     About a year later, a new DVT occurred while on Xarelto.  Clot is documented by doppler studies.  He seen by vascular surgery and cardiology and was started on warfarin.  Doing well on warfarin; INRs are stable.       My work-up 2 weeks ago showed several issues.  He has polycythemia that needs an evaluation; Also, he had an elevated UA; Last, he had an elevated total protein.  We started an additional work-up.     PAST MEDICAL HISTORY  recurrent thrombosis  lupus anticoagulant positive     Past Surgical History  none     FAMILY HISTORY; father  at 57 yo; had heart failure, previous hx of thrombosis and was on warfarin.  no history of thrombosis     SOCIAL HISTORY  , lives wife and two kids, works as a visual   smokes/vapes weed daily, one drink of whisky daily, denies tobacco     medications and allergies reviewed in emr       Physical Exam  Constitutional:       Appearance: Normal appearance. He is obese.   HENT:      Head: Normocephalic and  atraumatic.      Nose: Nose normal.      Mouth/Throat:      Mouth: Mucous membranes are dry.      Pharynx: Oropharynx is clear.   Cardiovascular:      Rate and Rhythm: Normal rate and regular rhythm.      Pulses: Normal pulses.      Heart sounds: Normal heart sounds.   Pulmonary:      Effort: Pulmonary effort is normal.      Breath sounds: Normal breath sounds.   Abdominal:      General: There is distension.   Musculoskeletal:         General: Normal range of motion.      Cervical back: Normal range of motion and neck supple.   Skin:     General: Skin is warm and dry.   Neurological:      General: No focal deficit present.      Mental Status: He is oriented to person, place, and time.   Psychiatric:         Mood and Affect: Mood normal.         Behavior: Behavior normal.         Thought Content: Thought content normal.         Judgment: Judgment normal.         WBC   Date/Time Value Ref Range Status   10/11/2023 01:56 PM 7.0 4.4 - 11.3 x10*3/uL Final   07/19/2023 02:01 PM 7.1 4.4 - 11.3 x10E9/L Final   05/17/2023 10:02 AM 5.6 4.4 - 11.3 x10E9/L Final   04/19/2023 10:39 AM 9.8 4.4 - 11.3 x10E9/L Final     nRBC   Date Value Ref Range Status   10/11/2023 0.0 0.0 - 0.0 /100 WBCs Final   02/01/2023 0.0 0.0 - 0.0 /100 WBC Final   09/01/2022 0.0 0.0 - 0.0 /100 WBC Final     RBC   Date Value Ref Range Status   10/11/2023 6.12 (H) 4.50 - 5.90 x10*6/uL Final   07/19/2023 5.91 (H) 4.50 - 5.90 x10E12/L Final   05/17/2023 5.59 4.50 - 5.90 x10E12/L Final   04/19/2023 5.88 4.50 - 5.90 x10E12/L Final     Hemoglobin   Date Value Ref Range Status   10/11/2023 15.1 13.5 - 17.5 g/dL Final   07/19/2023 14.9 13.5 - 17.5 g/dL Final   05/17/2023 14.9 13.5 - 17.5 g/dL Final   04/19/2023 15.8 13.5 - 17.5 g/dL Final     Hematocrit   Date Value Ref Range Status   10/11/2023 47.5 41.0 - 52.0 % Final   07/19/2023 47.2 41.0 - 52.0 % Final   05/17/2023 45.8 41.0 - 52.0 % Final   04/19/2023 48.7 41.0 - 52.0 % Final     MCV   Date/Time Value Ref  Range Status   10/11/2023 01:56 PM 78 (L) 80 - 100 fL Final   07/19/2023 02:01 PM 80 80 - 100 fL Final   05/17/2023 10:02 AM 82 80 - 100 fL Final   04/19/2023 10:39 AM 83 80 - 100 fL Final     MCH   Date/Time Value Ref Range Status   10/11/2023 01:56 PM 24.7 (L) 26.0 - 34.0 pg Final     MCHC   Date/Time Value Ref Range Status   10/11/2023 01:56 PM 31.8 (L) 32.0 - 36.0 g/dL Final   07/19/2023 02:01 PM 31.6 (L) 32.0 - 36.0 g/dL Final   05/17/2023 10:02 AM 32.5 32.0 - 36.0 g/dL Final   04/19/2023 10:39 AM 32.4 32.0 - 36.0 g/dL Final     RDW   Date/Time Value Ref Range Status   10/11/2023 01:56 PM 16.8 (H) 11.5 - 14.5 % Final   07/19/2023 02:01 PM 15.1 (H) 11.5 - 14.5 % Final   05/17/2023 10:02 AM 13.8 11.5 - 14.5 % Final   04/19/2023 10:39 AM 13.5 11.5 - 14.5 % Final     Platelets   Date/Time Value Ref Range Status   10/11/2023 01:56  150 - 450 x10*3/uL Final   07/19/2023 02:01  150 - 450 x10E9/L Final   05/17/2023 10:02  150 - 450 x10E9/L Final   04/19/2023 10:39  150 - 450 x10E9/L Final     MPV   Date/Time Value Ref Range Status   10/11/2023 01:56 PM 9.0 7.5 - 11.5 fL Final     Neutrophils %   Date/Time Value Ref Range Status   10/11/2023 01:56 PM 55.2 40.0 - 80.0 % Final   07/19/2023 02:01 PM 52.5 40.0 - 80.0 % Final   05/17/2023 10:02 AM 54.1 40.0 - 80.0 % Final   04/19/2023 10:39 AM 69.5 40.0 - 80.0 % Final     Immature Granulocytes %, Automated   Date/Time Value Ref Range Status   10/11/2023 01:56 PM 0.3 0.0 - 0.9 % Final     Comment:     Immature Granulocyte Count (IG) includes promyelocytes, myelocytes and metamyelocytes but does not include bands. Percent differential counts (%) should be interpreted in the context of the absolute cell counts (cells/UL).   07/19/2023 02:01 PM 0.4 0.0 - 0.9 % Final     Comment:      Immature Granulocyte Count (IG) includes promyelocytes,    myelocytes and metamyelocytes but does not include bands.   Percent differential counts (%) should be interpreted  in the   context of the absolute cell counts (cells/L).     05/17/2023 10:02 AM 0.2 0.0 - 0.9 % Final     Comment:      Immature Granulocyte Count (IG) includes promyelocytes,    myelocytes and metamyelocytes but does not include bands.   Percent differential counts (%) should be interpreted in the   context of the absolute cell counts (cells/L).     04/19/2023 10:39 AM 0.3 0.0 - 0.9 % Final     Comment:      Immature Granulocyte Count (IG) includes promyelocytes,    myelocytes and metamyelocytes but does not include bands.   Percent differential counts (%) should be interpreted in the   context of the absolute cell counts (cells/L).       Lymphocytes %   Date/Time Value Ref Range Status   10/11/2023 01:56 PM 31.6 13.0 - 44.0 % Final   07/19/2023 02:01 PM 34.5 13.0 - 44.0 % Final   05/17/2023 10:02 AM 31.8 13.0 - 44.0 % Final   04/19/2023 10:39 AM 19.4 13.0 - 44.0 % Final     Monocytes %   Date/Time Value Ref Range Status   10/11/2023 01:56 PM 7.6 2.0 - 10.0 % Final   07/19/2023 02:01 PM 7.8 2.0 - 10.0 % Final   05/17/2023 10:02 AM 7.3 2.0 - 10.0 % Final   04/19/2023 10:39 AM 7.0 2.0 - 10.0 % Final     Eosinophils %   Date/Time Value Ref Range Status   10/11/2023 01:56 PM 4.7 0.0 - 6.0 % Final   07/19/2023 02:01 PM 4.0 0.0 - 6.0 % Final   05/17/2023 10:02 AM 5.9 0.0 - 6.0 % Final   04/19/2023 10:39 AM 3.4 0.0 - 6.0 % Final     Basophils %   Date/Time Value Ref Range Status   10/11/2023 01:56 PM 0.6 0.0 - 2.0 % Final   07/19/2023 02:01 PM 0.8 0.0 - 2.0 % Final   05/17/2023 10:02 AM 0.7 0.0 - 2.0 % Final   04/19/2023 10:39 AM 0.4 0.0 - 2.0 % Final     Neutrophils Absolute   Date/Time Value Ref Range Status   10/11/2023 01:56 PM 3.86 1.20 - 7.70 x10*3/uL Final     Comment:     Percent differential counts (%) should be interpreted in the context of the absolute cell counts (cells/uL).   07/19/2023 02:01 PM 3.72 1.20 - 7.70 x10E9/L Final   05/17/2023 10:02 AM 3.03 1.20 - 7.70 x10E9/L Final   04/19/2023 10:39 AM 6.78 1.20  "- 7.70 x10E9/L Final     Immature Granulocytes Absolute, Automated   Date/Time Value Ref Range Status   10/11/2023 01:56 PM 0.02 0.00 - 0.70 x10*3/uL Final     Lymphocytes Absolute   Date/Time Value Ref Range Status   10/11/2023 01:56 PM 2.21 1.20 - 4.80 x10*3/uL Final   07/19/2023 02:01 PM 2.44 1.20 - 4.80 x10E9/L Final   05/17/2023 10:02 AM 1.78 1.20 - 4.80 x10E9/L Final   04/19/2023 10:39 AM 1.89 1.20 - 4.80 x10E9/L Final     Monocytes Absolute   Date/Time Value Ref Range Status   10/11/2023 01:56 PM 0.53 0.10 - 1.00 x10*3/uL Final   07/19/2023 02:01 PM 0.55 0.10 - 1.00 x10E9/L Final   05/17/2023 10:02 AM 0.41 0.10 - 1.00 x10E9/L Final   04/19/2023 10:39 AM 0.68 0.10 - 1.00 x10E9/L Final     Eosinophils Absolute   Date/Time Value Ref Range Status   10/11/2023 01:56 PM 0.33 0.00 - 0.70 x10*3/uL Final   07/19/2023 02:01 PM 0.28 0.00 - 0.70 x10E9/L Final   05/17/2023 10:02 AM 0.33 0.00 - 0.70 x10E9/L Final   04/19/2023 10:39 AM 0.33 0.00 - 0.70 x10E9/L Final     Basophils Absolute   Date/Time Value Ref Range Status   10/11/2023 01:56 PM 0.04 0.00 - 0.10 x10*3/uL Final   07/19/2023 02:01 PM 0.06 0.00 - 0.10 x10E9/L Final   05/17/2023 10:02 AM 0.04 0.00 - 0.10 x10E9/L Final   04/19/2023 10:39 AM 0.04 0.00 - 0.10 x10E9/L Final       No components found for: \"PT\"  aPTT   Date/Time Value Ref Range Status   02/01/2023 04:54 PM 50 (H) 26 - 39 sec Final     Comment:       THE APTT IS NO LONGER USED FOR MONITORING     UNFRACTIONATED HEPARIN THERAPY.    FOR MONITORING HEPARIN THERAPY,     USE THE HEPARIN ASSAY.     01/18/2023 11:24 AM 45 (H) 26 - 39 sec Final     Comment:       THE APTT IS NO LONGER USED FOR MONITORING     UNFRACTIONATED HEPARIN THERAPY.    FOR MONITORING HEPARIN THERAPY,     USE THE HEPARIN ASSAY.     Labs today:  Hematocrit=47.5; MCV 78; NORMAL COMPREHENSIVE.    Assessment/Plan       The patient is clincially stable.  I am concerned about his high cardiovascular risks with a high calcium score, high " cholesterol and an inabilty to take statins.  Needs to find an alternative to atorvastatin to lower cholesterol.  Lipitor gave him too many cramps.      Stable INR.  Get warfarin management at [facility] - cheaper;  Also expressed concerns about infusion center costs at [facility].   His insurnace wants him to go to a cheaper place.      Need phebotomy today with high Hematocrit.  We will offer it monthly.           Miguel Bullard MD

## 2023-10-16 ENCOUNTER — APPOINTMENT (OUTPATIENT)
Dept: PRIMARY CARE | Facility: CLINIC | Age: 48
End: 2023-10-16
Payer: COMMERCIAL

## 2023-10-25 ENCOUNTER — PATIENT MESSAGE (OUTPATIENT)
Dept: PRIMARY CARE | Facility: CLINIC | Age: 48
End: 2023-10-25
Payer: COMMERCIAL

## 2023-10-25 DIAGNOSIS — R91.1 PULMONARY NODULE: Primary | ICD-10-CM

## 2023-10-31 ENCOUNTER — OFFICE VISIT (OUTPATIENT)
Dept: PRIMARY CARE | Facility: CLINIC | Age: 48
End: 2023-10-31
Payer: COMMERCIAL

## 2023-10-31 VITALS
HEART RATE: 90 BPM | WEIGHT: 232 LBS | OXYGEN SATURATION: 96 % | TEMPERATURE: 97.6 F | SYSTOLIC BLOOD PRESSURE: 123 MMHG | BODY MASS INDEX: 35.16 KG/M2 | DIASTOLIC BLOOD PRESSURE: 78 MMHG | RESPIRATION RATE: 18 BRPM | HEIGHT: 68 IN

## 2023-10-31 DIAGNOSIS — M10.9 POLYARTICULAR GOUT: Primary | ICD-10-CM

## 2023-10-31 DIAGNOSIS — H60.501 ACUTE OTITIS EXTERNA OF RIGHT EAR, UNSPECIFIED TYPE: ICD-10-CM

## 2023-10-31 PROCEDURE — 3044F HG A1C LEVEL LT 7.0%: CPT | Performed by: INTERNAL MEDICINE

## 2023-10-31 PROCEDURE — 99212 OFFICE O/P EST SF 10 MIN: CPT | Performed by: INTERNAL MEDICINE

## 2023-10-31 PROCEDURE — 3074F SYST BP LT 130 MM HG: CPT | Performed by: INTERNAL MEDICINE

## 2023-10-31 PROCEDURE — 3078F DIAST BP <80 MM HG: CPT | Performed by: INTERNAL MEDICINE

## 2023-10-31 PROCEDURE — 1036F TOBACCO NON-USER: CPT | Performed by: INTERNAL MEDICINE

## 2023-10-31 RX ORDER — NEOMYCIN SULFATE, POLYMYXIN B SULFATE, HYDROCORTISONE 3.5; 10000; 1 MG/ML; [USP'U]/ML; MG/ML
2 SOLUTION/ DROPS AURICULAR (OTIC) 4 TIMES DAILY
Qty: 10 ML | Refills: 3 | Status: SHIPPED | OUTPATIENT
Start: 2023-10-31 | End: 2024-04-24

## 2023-10-31 ASSESSMENT — ENCOUNTER SYMPTOMS
SINUS PAIN: 0
SHORTNESS OF BREATH: 0
HEADACHES: 0
SINUS PRESSURE: 0
LIGHT-HEADEDNESS: 0
SORE THROAT: 0

## 2023-10-31 ASSESSMENT — PAIN SCALES - GENERAL: PAINLEVEL: 0-NO PAIN

## 2023-10-31 NOTE — PROGRESS NOTES
"Subjective   Patient ID: Brett Carrasco is a 47 y.o. male who presents for Earache.  Recurrent ear infections that required drops.  When it recurrs he takes it and it works. It has been bothering him for over a year. He tried OTC ear drops.      Earache   Pertinent negatives include no headaches or sore throat.        Review of Systems   HENT:  Positive for ear pain. Negative for congestion, sinus pressure, sinus pain and sore throat.    Respiratory:  Negative for shortness of breath.    Cardiovascular:  Negative for chest pain.   Neurological:  Negative for light-headedness and headaches.       Objective   /78 (BP Location: Left arm, Patient Position: Sitting, BP Cuff Size: Large adult)   Pulse 90   Temp 36.4 °C (97.6 °F) (Temporal)   Resp 18   Ht 1.727 m (5' 8\")   Wt 105 kg (232 lb)   SpO2 96%   BMI 35.28 kg/m²     Physical Exam  Constitutional:       Appearance: Normal appearance.   HENT:      Head: Normocephalic and atraumatic.      Right Ear: Tympanic membrane and ear canal normal.      Left Ear: Tympanic membrane and ear canal normal.      Nose: Nose normal.      Mouth/Throat:      Mouth: Mucous membranes are moist.   Eyes:      Extraocular Movements: Extraocular movements intact.   Cardiovascular:      Rate and Rhythm: Normal rate and regular rhythm.   Pulmonary:      Effort: No respiratory distress.      Breath sounds: No wheezing.         Assessment/Plan   Problem List Items Addressed This Visit             ICD-10-CM    Acute otitis externa of right ear H60.501    Relevant Medications    neomycin-polymyxin-HC (Cortisporin) otic solution    Other Relevant Orders    Referral to ENT     His exam for me was completely normal.  We will send him to ENT for further evaluation.     "

## 2023-11-01 RX ORDER — ALLOPURINOL 100 MG/1
100 TABLET ORAL DAILY
Qty: 90 TABLET | Refills: 3 | Status: SHIPPED | OUTPATIENT
Start: 2023-11-01 | End: 2024-10-31

## 2023-11-11 DIAGNOSIS — I10 PRIMARY HYPERTENSION: Primary | ICD-10-CM

## 2023-11-13 DIAGNOSIS — E11.9 TYPE 2 DIABETES MELLITUS WITHOUT COMPLICATION, WITHOUT LONG-TERM CURRENT USE OF INSULIN (MULTI): ICD-10-CM

## 2023-11-13 RX ORDER — AMLODIPINE BESYLATE 5 MG/1
5 TABLET ORAL DAILY
Qty: 90 TABLET | Refills: 3 | Status: SHIPPED | OUTPATIENT
Start: 2023-11-13

## 2023-11-13 RX ORDER — METFORMIN HYDROCHLORIDE 500 MG/1
500 TABLET ORAL
Qty: 180 TABLET | Refills: 2 | Status: SHIPPED | OUTPATIENT
Start: 2023-11-13 | End: 2024-05-13

## 2023-11-30 ENCOUNTER — TELEPHONE (OUTPATIENT)
Dept: PULMONOLOGY | Facility: HOSPITAL | Age: 48
End: 2023-11-30
Payer: COMMERCIAL

## 2023-12-01 ENCOUNTER — APPOINTMENT (OUTPATIENT)
Dept: PULMONOLOGY | Facility: HOSPITAL | Age: 48
End: 2023-12-01
Payer: COMMERCIAL

## 2023-12-06 ENCOUNTER — APPOINTMENT (OUTPATIENT)
Dept: PULMONOLOGY | Facility: HOSPITAL | Age: 48
End: 2023-12-06
Payer: COMMERCIAL

## 2023-12-06 ENCOUNTER — APPOINTMENT (OUTPATIENT)
Dept: HEMATOLOGY/ONCOLOGY | Facility: HOSPITAL | Age: 48
End: 2023-12-06
Payer: COMMERCIAL

## 2023-12-18 ENCOUNTER — TELEPHONE (OUTPATIENT)
Dept: PULMONOLOGY | Facility: HOSPITAL | Age: 48
End: 2023-12-18

## 2023-12-29 ENCOUNTER — APPOINTMENT (OUTPATIENT)
Dept: PULMONOLOGY | Facility: HOSPITAL | Age: 48
End: 2023-12-29
Payer: COMMERCIAL

## 2024-01-03 ENCOUNTER — APPOINTMENT (OUTPATIENT)
Dept: HEMATOLOGY/ONCOLOGY | Facility: HOSPITAL | Age: 49
End: 2024-01-03
Payer: COMMERCIAL

## 2024-01-09 ENCOUNTER — OFFICE VISIT (OUTPATIENT)
Dept: OTOLARYNGOLOGY | Facility: CLINIC | Age: 49
End: 2024-01-09
Payer: COMMERCIAL

## 2024-01-09 ENCOUNTER — CLINICAL SUPPORT (OUTPATIENT)
Dept: AUDIOLOGY | Facility: CLINIC | Age: 49
End: 2024-01-09
Payer: COMMERCIAL

## 2024-01-09 ENCOUNTER — APPOINTMENT (OUTPATIENT)
Dept: OTOLARYNGOLOGY | Facility: CLINIC | Age: 49
End: 2024-01-09
Payer: COMMERCIAL

## 2024-01-09 VITALS — HEIGHT: 68 IN | BODY MASS INDEX: 35.9 KG/M2 | WEIGHT: 236.9 LBS | TEMPERATURE: 97.8 F

## 2024-01-09 DIAGNOSIS — H60.8X1 CHRONIC ECZEMATOUS OTITIS EXTERNA OF RIGHT EAR: ICD-10-CM

## 2024-01-09 DIAGNOSIS — M26.609 TMJ DYSFUNCTION: ICD-10-CM

## 2024-01-09 DIAGNOSIS — H92.03 OTALGIA OF BOTH EARS: ICD-10-CM

## 2024-01-09 DIAGNOSIS — H93.8X1 SENSATION OF FULLNESS IN RIGHT EAR: Primary | ICD-10-CM

## 2024-01-09 DIAGNOSIS — H92.03 OTALGIA OF BOTH EARS: Primary | ICD-10-CM

## 2024-01-09 DIAGNOSIS — H60.501 ACUTE OTITIS EXTERNA OF RIGHT EAR, UNSPECIFIED TYPE: ICD-10-CM

## 2024-01-09 PROCEDURE — 92550 TYMPANOMETRY & REFLEX THRESH: CPT | Performed by: AUDIOLOGIST

## 2024-01-09 PROCEDURE — 99204 OFFICE O/P NEW MOD 45 MIN: CPT | Performed by: NURSE PRACTITIONER

## 2024-01-09 PROCEDURE — 92557 COMPREHENSIVE HEARING TEST: CPT | Performed by: AUDIOLOGIST

## 2024-01-09 PROCEDURE — 1036F TOBACCO NON-USER: CPT | Performed by: NURSE PRACTITIONER

## 2024-01-09 RX ORDER — MOMETASONE FUROATE 1 MG/G
CREAM TOPICAL
Qty: 15 G | Refills: 1 | Status: SHIPPED | OUTPATIENT
Start: 2024-01-09 | End: 2024-04-24

## 2024-01-09 RX ORDER — FLUOCINOLONE ACETONIDE 0.11 MG/ML
OIL AURICULAR (OTIC)
Qty: 20 ML | Refills: 2 | Status: SHIPPED | OUTPATIENT
Start: 2024-01-09

## 2024-01-09 NOTE — PROGRESS NOTES
"Subjective   Patient ID: Brett Carrasco is a 48 y.o. male who presents for Recurrent Otitis and Earache (Right ear).  HPI  This patient is referred for evaluation of a sensation of clogged right ear.  The patient is not accompanied by anyone.   When asked about ear pain, hearing loss, itching, discharge from ear, tinnitus, aural fullness or autophony, the patient admits to a full/\"hydrogen peroxide\" sensation to right auricle.  This is exacerbated by pressure, like sleeping on the right side.  He also reports occasional sharp bilateral otalgia.  Patient reports that he has been treated a few times over the years with antibiotic eardrops which usually improve his right-sided symptoms.  He also reports intermittent jaw pain.  He endorses right EAC itching as well.  The patient does not wear a hearing aid.  When asked about a significant past otological history including history of prior ear surgery, noise exposure, exposure to ototoxic drugs or agents, and/or family history of hearing loss, the patient admits to none.  Patient endorses history of bruxism.  It was recommended that he get a nightguard in the past which he never did.  He has been looking at over-the-counter options.    Review of Systems  A comprehensive or 10 points review of the patient´s constitutional, neurological, HEENT, pulmonary, cardiovascular and genito-urinary systems showed only those mentioned in history of present illness.    Objective   Physical Exam  Constitutional: no fever, chills, weight loss or weight gain   General appearance: Appears well, well-nourished, well groomed. No acute distress.   Communication: Normal communication   Psychiatric: Oriented to person, place and time. Normal mood and affect.   Neurologic: Cranial nerves II-XII grossly intact and symmetric bilaterally.   Head and Face:   Head: Atraumatic with no masses, lesions or scarring.   Face: Normal symmetry, no paralysis, synkinesis or facial tic. No scars or deformities. "   TMJ: Right greater than left crepitus and tenderness  Eyes: Conjunctiva not edematous or erythematous   Ears: External inspection of ears with no deformity, scars or masses with the exception of thickening of tissue to the right auricle just above the tragus/conchal bowl. Bilateral EACs clear and bilateral TMs intact with no signs of effusions.  Both ears examined with the microscope.  Right EAC is slightly erythematous and peeling.    Neck: Normal appearing, symmetric, trachea midline.   Cardiovascular: Examination of peripheral vascular system shows no clubbing or cyanosis.   Respiratory: No respiratory distress increased work of breathing. Inspection of the chest with symmetric chest expansion and normal respiratory effort.   Skin: No rashes in the head or neck  My interpretation of the audiogram done today is normal hearing with excellent word recognition scores and normal tympanograms bilaterally.  Assessment/Plan     This patient presents for initial evaluation of chronic acquired right aural fullness, bilateral otalgia, right eczematous otitis externa, and bilateral TMJ dysfunction.    Reassurance given that there is no sign of current infection and his audiogram is normal.  I believe his symptoms are likely combination of right eczematous otitis externa and bilateral TMJ dysfunction.  I recommended steroid cream to the outer ear and steroid drops to right ear canal.  Both should be used twice daily x 7 days then as needed.  I also recommended comfort measures for chronic TMJ dysfunction and encouraged him to get a nightguard.  He may follow-up with me as needed.  All questions were answered to patient's satisfaction.    This note was created using speech recognition transcription software. Despite proofreading, several typographical errors might be present that might affect the meaning of the content. Please call with any questions.         LESIA Sinclair 01/09/24 11:49 AM

## 2024-01-09 NOTE — PROGRESS NOTES
Chief Complaint   Patient presents with    Earache     HISTORY:  Brett Carrasco, age 48 years, was seen for audiogram in conjunction with otolaryngology appointment on 1/9/2024.  Mr. Carrasco reports bilateral ear pain with the right ear worse than the left ear.  He has been treated with antibiotic ear drops three times since 2022.  He denies hearing loss but does not some difficulty understanding speech at a distance.  He reports his right ear feels as if peroxide is in the ear.  Please see medical records for complete history.    RESULTS:  Prior to testing both external auditory canals were clear and tympanic membranes visualized    Immittance and acoustic reflexes:  Immittance testing yielded TYPE A tympanogram indicating normal middle ear function right ear  Immittance testing yielded TYPE A tympanogram indicating normal middle ear function left ear  Acoustic reflexes were present 500 - 4000 Hz right ear  Acoustic reflexes were present 500 - 4000 Hz left ear    Audiogram:  Normal hearing levels were obtained 250 - 8000 Hz both ears  Speech reception thresholds obtained at 5 dBHL both ears  Speech discrimination scores were 100% at 50 dBHL    IMPRESSIONS:  Normal middle ear function noted both ears  Normal acoustic reflexes noted both ears  Normal hearing levels     RECOMMENDATIONS:  1.  Follow up with otolaryngology  2.  Retest hearing levels as needed    time: 218 - 367

## 2024-01-10 ENCOUNTER — TELEPHONE (OUTPATIENT)
Dept: PULMONOLOGY | Facility: HOSPITAL | Age: 49
End: 2024-01-10
Payer: COMMERCIAL

## 2024-01-10 NOTE — TELEPHONE ENCOUNTER
left vm letting patient know that appoint is rescheduled for 1/31/2024 @ 4pm. Left scheduling phone # if new time does not work.

## 2024-01-17 ENCOUNTER — APPOINTMENT (OUTPATIENT)
Dept: PULMONOLOGY | Facility: HOSPITAL | Age: 49
End: 2024-01-17
Payer: COMMERCIAL

## 2024-01-31 ENCOUNTER — LAB (OUTPATIENT)
Dept: LAB | Facility: HOSPITAL | Age: 49
End: 2024-01-31
Payer: COMMERCIAL

## 2024-01-31 ENCOUNTER — OFFICE VISIT (OUTPATIENT)
Dept: PULMONOLOGY | Facility: HOSPITAL | Age: 49
End: 2024-01-31
Payer: COMMERCIAL

## 2024-01-31 ENCOUNTER — INFUSION (OUTPATIENT)
Dept: HEMATOLOGY/ONCOLOGY | Facility: HOSPITAL | Age: 49
End: 2024-01-31
Payer: COMMERCIAL

## 2024-01-31 VITALS
HEART RATE: 82 BPM | TEMPERATURE: 97.7 F | BODY MASS INDEX: 35.77 KG/M2 | WEIGHT: 235.23 LBS | OXYGEN SATURATION: 99 % | DIASTOLIC BLOOD PRESSURE: 78 MMHG | RESPIRATION RATE: 18 BRPM | SYSTOLIC BLOOD PRESSURE: 129 MMHG

## 2024-01-31 VITALS
TEMPERATURE: 98 F | OXYGEN SATURATION: 99 % | WEIGHT: 238 LBS | HEART RATE: 111 BPM | DIASTOLIC BLOOD PRESSURE: 81 MMHG | BODY MASS INDEX: 36.19 KG/M2 | SYSTOLIC BLOOD PRESSURE: 118 MMHG

## 2024-01-31 DIAGNOSIS — R91.1 PULMONARY NODULE: Primary | ICD-10-CM

## 2024-01-31 DIAGNOSIS — D75.1 POLYCYTHEMIA, SECONDARY: ICD-10-CM

## 2024-01-31 LAB
ALBUMIN SERPL BCP-MCNC: 4.8 G/DL (ref 3.4–5)
ALP SERPL-CCNC: 66 U/L (ref 33–120)
ALT SERPL W P-5'-P-CCNC: 28 U/L (ref 10–52)
ANION GAP SERPL CALC-SCNC: 15 MMOL/L (ref 10–20)
AST SERPL W P-5'-P-CCNC: 20 U/L (ref 9–39)
BASOPHILS # BLD AUTO: 0.04 X10*3/UL (ref 0–0.1)
BASOPHILS NFR BLD AUTO: 0.6 %
BILIRUB SERPL-MCNC: 0.7 MG/DL (ref 0–1.2)
BUN SERPL-MCNC: 16 MG/DL (ref 6–23)
CALCIUM SERPL-MCNC: 9.6 MG/DL (ref 8.6–10.3)
CHLORIDE SERPL-SCNC: 98 MMOL/L (ref 98–107)
CO2 SERPL-SCNC: 29 MMOL/L (ref 21–32)
CREAT SERPL-MCNC: 1.02 MG/DL (ref 0.5–1.3)
EGFRCR SERPLBLD CKD-EPI 2021: >90 ML/MIN/1.73M*2
EOSINOPHIL # BLD AUTO: 0.2 X10*3/UL (ref 0–0.7)
EOSINOPHIL NFR BLD AUTO: 2.9 %
ERYTHROCYTE [DISTWIDTH] IN BLOOD BY AUTOMATED COUNT: 17.7 % (ref 11.5–14.5)
FERRITIN SERPL-MCNC: 42 NG/ML (ref 20–300)
GLUCOSE SERPL-MCNC: 108 MG/DL (ref 74–99)
HCT VFR BLD AUTO: 52.9 % (ref 41–52)
HGB BLD-MCNC: 16.9 G/DL (ref 13.5–17.5)
IMM GRANULOCYTES # BLD AUTO: 0.02 X10*3/UL (ref 0–0.7)
IMM GRANULOCYTES NFR BLD AUTO: 0.3 % (ref 0–0.9)
LYMPHOCYTES # BLD AUTO: 2.42 X10*3/UL (ref 1.2–4.8)
LYMPHOCYTES NFR BLD AUTO: 34.7 %
MCH RBC QN AUTO: 25.2 PG (ref 26–34)
MCHC RBC AUTO-ENTMCNC: 31.9 G/DL (ref 32–36)
MCV RBC AUTO: 79 FL (ref 80–100)
MONOCYTES # BLD AUTO: 0.56 X10*3/UL (ref 0.1–1)
MONOCYTES NFR BLD AUTO: 8 %
NEUTROPHILS # BLD AUTO: 3.74 X10*3/UL (ref 1.2–7.7)
NEUTROPHILS NFR BLD AUTO: 53.5 %
NRBC BLD-RTO: 0 /100 WBCS (ref 0–0)
PLATELET # BLD AUTO: 233 X10*3/UL (ref 150–450)
POTASSIUM SERPL-SCNC: 3.6 MMOL/L (ref 3.5–5.3)
PROT SERPL-MCNC: 8.7 G/DL (ref 6.4–8.2)
RBC # BLD AUTO: 6.7 X10*6/UL (ref 4.5–5.9)
SODIUM SERPL-SCNC: 138 MMOL/L (ref 136–145)
WBC # BLD AUTO: 7 X10*3/UL (ref 4.4–11.3)

## 2024-01-31 PROCEDURE — 85025 COMPLETE CBC W/AUTO DIFF WBC: CPT

## 2024-01-31 PROCEDURE — 3074F SYST BP LT 130 MM HG: CPT | Performed by: STUDENT IN AN ORGANIZED HEALTH CARE EDUCATION/TRAINING PROGRAM

## 2024-01-31 PROCEDURE — 99203 OFFICE O/P NEW LOW 30 MIN: CPT | Performed by: STUDENT IN AN ORGANIZED HEALTH CARE EDUCATION/TRAINING PROGRAM

## 2024-01-31 PROCEDURE — 3079F DIAST BP 80-89 MM HG: CPT | Performed by: STUDENT IN AN ORGANIZED HEALTH CARE EDUCATION/TRAINING PROGRAM

## 2024-01-31 PROCEDURE — 36415 COLL VENOUS BLD VENIPUNCTURE: CPT

## 2024-01-31 PROCEDURE — 80053 COMPREHEN METABOLIC PANEL: CPT

## 2024-01-31 PROCEDURE — 82728 ASSAY OF FERRITIN: CPT

## 2024-01-31 PROCEDURE — 99195 PHLEBOTOMY: CPT

## 2024-01-31 PROCEDURE — 1036F TOBACCO NON-USER: CPT | Performed by: STUDENT IN AN ORGANIZED HEALTH CARE EDUCATION/TRAINING PROGRAM

## 2024-01-31 PROCEDURE — 99213 OFFICE O/P EST LOW 20 MIN: CPT | Performed by: STUDENT IN AN ORGANIZED HEALTH CARE EDUCATION/TRAINING PROGRAM

## 2024-01-31 RX ORDER — HEPARIN 100 UNIT/ML
500 SYRINGE INTRAVENOUS AS NEEDED
OUTPATIENT
Start: 2024-01-31

## 2024-01-31 RX ORDER — EPINEPHRINE 0.3 MG/.3ML
0.3 INJECTION SUBCUTANEOUS EVERY 5 MIN PRN
OUTPATIENT
Start: 2024-03-06

## 2024-01-31 RX ORDER — FAMOTIDINE 10 MG/ML
20 INJECTION INTRAVENOUS ONCE AS NEEDED
OUTPATIENT
Start: 2024-03-06

## 2024-01-31 RX ORDER — ALBUTEROL SULFATE 0.83 MG/ML
3 SOLUTION RESPIRATORY (INHALATION) AS NEEDED
OUTPATIENT
Start: 2024-03-06

## 2024-01-31 RX ORDER — DIPHENHYDRAMINE HYDROCHLORIDE 50 MG/ML
50 INJECTION INTRAMUSCULAR; INTRAVENOUS AS NEEDED
OUTPATIENT
Start: 2024-03-06

## 2024-01-31 RX ORDER — HEPARIN SODIUM,PORCINE/PF 10 UNIT/ML
50 SYRINGE (ML) INTRAVENOUS AS NEEDED
OUTPATIENT
Start: 2024-01-31

## 2024-01-31 ASSESSMENT — PATIENT HEALTH QUESTIONNAIRE - PHQ9
SUM OF ALL RESPONSES TO PHQ9 QUESTIONS 1 & 2: 0
1. LITTLE INTEREST OR PLEASURE IN DOING THINGS: NOT AT ALL
2. FEELING DOWN, DEPRESSED OR HOPELESS: NOT AT ALL

## 2024-01-31 ASSESSMENT — LIFESTYLE VARIABLES: HOW OFTEN DO YOU HAVE A DRINK CONTAINING ALCOHOL: MONTHLY OR LESS

## 2024-01-31 ASSESSMENT — PAIN SCALES - GENERAL
PAINLEVEL: 0-NO PAIN
PAINLEVEL: 0-NO PAIN

## 2024-01-31 NOTE — PATIENT INSTRUCTIONS
Thank you for coming into the clinic today. It was a pleasure to see you!     Today we discussed the following:   - We will get a CT chest to evaluate the nodule     Please follow up with me after the CT chest.    If you have any further questions feel free to call my office at [phone] and please allow 1-2 business days for a response.     If you have any scheduling needs feel free to call [phone] (for breathing or walking test), [phone] (for EKG's, echocardiograms or cardiopulmonary stress test), and [phone] (for radiology tests such as CT scan, MRIs and nuclear medicine tests).    Susan Reed  Pulmonary and Critical Care Fellow     [id number] [place] [address]

## 2024-02-03 NOTE — PROGRESS NOTES
Subjective  HPI  48-year-old male was referred to the pulmonary clinic due to pulmonary nodule noticed on cardiac CT.    Patient appears comfortable at bedside.  Denies any respiratory complaints.  Denies shortness of breath, cough, chest pain, fevers, weight loss or night sweats.  He underwent a CT cardiac scoring without IV contrast which showed 1 cm LLL nodule, which prompted a pulmonary referral.    Patient has a history of multiple DVTs and PE.  He is currently getting evaluated by hematology for polycythemia. He gets monthly phlebotomy for polycythemia. His work up is positive for lupus anticoagulant but negative for antiphospholipid antibodies, antithrombin III, protein C and S, factor V leiden and prothrombin gene mutation.  Currently on warfarin.    PMH: Polycythemia, DVT, hypertension  PSH: None  FH: No family history of lung disease  SH: Never smoker, works as a , has 1 cat at home    Review of systems  Constitutional: Denies fever, weight loss or night sweats.  Eyes: Denies blurring of vision or double vision.  ENT: Denies nasal discharge, ear pain or throat pain.  Respiratory: As noted in HPI.  Cardiovascular: Denies chest pain or palpitations.  Gastrointestinal: Denies abdominal pain, nausea, vomiting, diarrhea or constipation.  Neurological: Denies headache, confusion or lightheadedness.    All other systems have been reviewed and are negative.    Objective  Vitals:    01/31/24 1629   BP: 118/81   Pulse: (!) 111   Temp: 36.7 °C (98 °F)   SpO2: 99%       Physical exam  General: Awake and alert. In no acute distress.   Eyes: PERRL. Clear sclera.  ENT: Neck supple. Mucus membranes moist.  Neck: Trachea midline. No JVD.   Respiratory: Equal air entry bilaterally. No added sounds.  Cardiovascular: Regular rate and rhythm. S1S2 heard.  Gastrointestinal: Soft, non-distended. Bowel sounds present.  Neurological: Awake and alert. No focal motor deficits.  Skin: Warm and dry. No  rash.  Extremities: No pedal edema.    Data  CT cardiac scoring  IMPRESSION:  1. Coronary artery calcium score of 0*.  2. Suggestion of a 10 mm left lower lobe nodular density, partially imaged as described above. Recommend dedicated CT of the chest to fully include this possible lesion and further assess.    Assessment and plan  48-year-old male with history of multiple DVT/PE on warfarin, was referred to the pulmonary clinic due to pulmonary nodule noticed on cardiac CT.    Assessment:   # LLL nodule, 1 cm    Plan:   - CT chest wo contrast    Assessment and plan was discussed with Dr. Powers.    This note was created using speech recognition transcription software. Despite proofreading, several typographical errors might be present that might affect the meaning of the content.    Susan Reed  Pulmonary and Critical Care  PGY-6

## 2024-02-07 DIAGNOSIS — I10 PRIMARY HYPERTENSION: Primary | ICD-10-CM

## 2024-02-07 RX ORDER — HYDROCHLOROTHIAZIDE 25 MG/1
25 TABLET ORAL DAILY
Qty: 90 TABLET | Refills: 3 | Status: SHIPPED | OUTPATIENT
Start: 2024-02-07

## 2024-02-15 ENCOUNTER — APPOINTMENT (OUTPATIENT)
Dept: RADIOLOGY | Facility: HOSPITAL | Age: 49
End: 2024-02-15
Payer: COMMERCIAL

## 2024-02-28 ENCOUNTER — APPOINTMENT (OUTPATIENT)
Dept: HEMATOLOGY/ONCOLOGY | Facility: HOSPITAL | Age: 49
End: 2024-02-28
Payer: COMMERCIAL

## 2024-03-08 ENCOUNTER — APPOINTMENT (OUTPATIENT)
Dept: RADIOLOGY | Facility: HOSPITAL | Age: 49
End: 2024-03-08
Payer: COMMERCIAL

## 2024-03-12 DIAGNOSIS — I73.9 CLAUDICATION, INTERMITTENT (CMS-HCC): Primary | ICD-10-CM

## 2024-03-12 RX ORDER — WARFARIN SODIUM 5 MG/1
TABLET ORAL
Qty: 180 TABLET | Refills: 0 | Status: SHIPPED | OUTPATIENT
Start: 2024-03-12 | End: 2024-06-10

## 2024-03-27 ENCOUNTER — APPOINTMENT (OUTPATIENT)
Dept: HEMATOLOGY/ONCOLOGY | Facility: HOSPITAL | Age: 49
End: 2024-03-27
Payer: COMMERCIAL

## 2024-04-05 ENCOUNTER — HOSPITAL ENCOUNTER (OUTPATIENT)
Dept: RADIOLOGY | Facility: HOSPITAL | Age: 49
Discharge: HOME | End: 2024-04-05
Payer: COMMERCIAL

## 2024-04-05 DIAGNOSIS — R91.1 PULMONARY NODULE: ICD-10-CM

## 2024-04-05 PROCEDURE — 71250 CT THORAX DX C-: CPT

## 2024-04-05 PROCEDURE — 71250 CT THORAX DX C-: CPT | Performed by: RADIOLOGY

## 2024-04-06 DIAGNOSIS — R91.8 LUNG NODULES: Primary | ICD-10-CM

## 2024-04-10 ENCOUNTER — OFFICE VISIT (OUTPATIENT)
Dept: PULMONOLOGY | Facility: HOSPITAL | Age: 49
End: 2024-04-10
Payer: COMMERCIAL

## 2024-04-10 VITALS
OXYGEN SATURATION: 97 % | DIASTOLIC BLOOD PRESSURE: 90 MMHG | BODY MASS INDEX: 36.95 KG/M2 | TEMPERATURE: 98 F | WEIGHT: 243 LBS | HEART RATE: 118 BPM | SYSTOLIC BLOOD PRESSURE: 128 MMHG

## 2024-04-10 DIAGNOSIS — R91.1 PULMONARY NODULE: ICD-10-CM

## 2024-04-10 PROCEDURE — 99213 OFFICE O/P EST LOW 20 MIN: CPT | Performed by: STUDENT IN AN ORGANIZED HEALTH CARE EDUCATION/TRAINING PROGRAM

## 2024-04-10 PROCEDURE — 3080F DIAST BP >= 90 MM HG: CPT | Performed by: STUDENT IN AN ORGANIZED HEALTH CARE EDUCATION/TRAINING PROGRAM

## 2024-04-10 PROCEDURE — 3074F SYST BP LT 130 MM HG: CPT | Performed by: STUDENT IN AN ORGANIZED HEALTH CARE EDUCATION/TRAINING PROGRAM

## 2024-04-10 RX ORDER — LIDOCAINE 560 MG/1
2 PATCH PERCUTANEOUS; TOPICAL; TRANSDERMAL
COMMUNITY
Start: 2024-04-08 | End: 2024-04-13

## 2024-04-10 RX ORDER — DICLOFENAC SODIUM 10 MG/G
4 GEL TOPICAL 4 TIMES DAILY
COMMUNITY
Start: 2024-04-08 | End: 2024-04-14

## 2024-04-10 ASSESSMENT — PATIENT HEALTH QUESTIONNAIRE - PHQ9
1. LITTLE INTEREST OR PLEASURE IN DOING THINGS: NOT AT ALL
2. FEELING DOWN, DEPRESSED OR HOPELESS: NOT AT ALL
SUM OF ALL RESPONSES TO PHQ9 QUESTIONS 1 & 2: 0

## 2024-04-10 ASSESSMENT — PAIN SCALES - GENERAL: PAINLEVEL: 0-NO PAIN

## 2024-04-10 ASSESSMENT — LIFESTYLE VARIABLES: HOW OFTEN DO YOU HAVE A DRINK CONTAINING ALCOHOL: NEVER

## 2024-04-10 NOTE — PATIENT INSTRUCTIONS
Thank you for coming into the clinic today. It was a pleasure to see you!     Today we discussed the following:   - Will repeat CT in 3 months    Please follow up with me after 3 months    If you have any further questions feel free to call my office at [phone] (choose #2 to reach a pulmonary nurse) and please allow 1-2 business days for a response.     If you have any scheduling needs feel free to call [phone] (for breathing or walking test), [phone] (for EKG's, echocardiograms or cardiopulmonary stress test), and [phone] (for radiology tests such as CT scan, MRIs and nuclear medicine tests).    Susan Reed  Pulmonary and Critical Care Fellow     [address] [place] [address]

## 2024-04-14 NOTE — PROGRESS NOTES
Subjective  HPI  48-year-old male follows up up in pulmonary clinic for evaluation of LLL pulmonary nodule.      Patient appears comfortable at bedside.  Denies any respiratory complaints.  Denies shortness of breath, cough, chest pain, fevers, weight loss or night sweats.  He underwent a CT cardiac scoring without IV contrast which showed 1 cm LLL nodule, which prompted a pulmonary referral.  He underwent a CT chest to evaluate the entire lung parenchyma, found to have 1.3 x1.2 cm LLL pleural-based nodule.     Patient has a history of multiple DVTs and PE.  He is currently getting evaluated by hematology for polycythemia. He gets monthly phlebotomy for polycythemia. His work up is positive for lupus anticoagulant but negative for antiphospholipid antibodies, antithrombin III, protein C and S, factor V leiden and prothrombin gene mutation.  Currently on warfarin.     PMH: Polycythemia, DVT, hypertension  PSH: None  FH: No family history of lung disease  SH: Never smoker, works as a , has 1 cat at home    Review of systems  Constitutional: Denies fever, weight loss or night sweats.  Eyes: Denies blurring of vision or double vision.  ENT: Denies nasal discharge, ear pain or throat pain.  Respiratory: As noted in HPI.  Cardiovascular: Denies chest pain or palpitations.  Gastrointestinal: Denies abdominal pain, nausea, vomiting, diarrhea or constipation.  Neurological: Denies headache, confusion or lightheadedness.    All other systems have been reviewed and are negative.    Objective  Vitals:    04/10/24 1553   BP: 128/90   Pulse: (!) 118   Temp: 36.7 °C (98 °F)   SpO2: 97%       Physical exam  General: Awake and alert. In no acute distress.   Eyes: PERRL. Clear sclera.  ENT: Neck supple. Mucus membranes moist.  Neck: Trachea midline. No JVD.   Respiratory: Equal air entry bilaterally. No added sounds.  Cardiovascular: Regular rate and rhythm. S1S2 heard.  Gastrointestinal: Soft, non-distended. Bowel  sounds present.  Neurological: Awake and alert. No focal motor deficits.  Skin: Warm and dry. No rash.  Extremities: No pedal edema.    Data  CT chest 4/5/2024  IMPRESSION:  1.  1.3 x 1.2 cm left lower lobe pleural based nodular density.  Cannot assess for possible interval change or stability due to  partial inclusion on the prior CT. Recommend short-term follow-up  chest CT in 3 months for continued surveillance.    Assessment and plan  48-year-old male with history of multiple DVT/PE on warfarin, being followed in the pulmonary clinic for evaluation of left lower lobe pulmonary nodule.     Assessment:   # LLL nodule, 1.3 x 1.2 cm     Plan:   -Repeat CT chest in 3 months.     Assessment and plan was discussed with Dr. Mosqueda.     This note was created using speech recognition transcription software. Despite proofreading, several typographical errors might be present that might affect the meaning of the content.     Susan Reed  Pulmonary and Critical Care  PGY-6

## 2024-04-24 ENCOUNTER — LAB (OUTPATIENT)
Dept: LAB | Facility: HOSPITAL | Age: 49
End: 2024-04-24
Payer: COMMERCIAL

## 2024-04-24 ENCOUNTER — OFFICE VISIT (OUTPATIENT)
Dept: CARDIOLOGY | Facility: CLINIC | Age: 49
End: 2024-04-24
Payer: COMMERCIAL

## 2024-04-24 ENCOUNTER — INFUSION (OUTPATIENT)
Dept: HEMATOLOGY/ONCOLOGY | Facility: HOSPITAL | Age: 49
End: 2024-04-24
Payer: COMMERCIAL

## 2024-04-24 ENCOUNTER — OFFICE VISIT (OUTPATIENT)
Dept: HEMATOLOGY/ONCOLOGY | Facility: HOSPITAL | Age: 49
End: 2024-04-24
Payer: COMMERCIAL

## 2024-04-24 VITALS
RESPIRATION RATE: 16 BRPM | OXYGEN SATURATION: 97 % | WEIGHT: 236.9 LBS | TEMPERATURE: 97.3 F | HEART RATE: 94 BPM | DIASTOLIC BLOOD PRESSURE: 78 MMHG | BODY MASS INDEX: 34.98 KG/M2 | SYSTOLIC BLOOD PRESSURE: 118 MMHG

## 2024-04-24 VITALS
DIASTOLIC BLOOD PRESSURE: 86 MMHG | HEART RATE: 86 BPM | OXYGEN SATURATION: 98 % | HEIGHT: 69 IN | BODY MASS INDEX: 35.89 KG/M2 | WEIGHT: 242.3 LBS | SYSTOLIC BLOOD PRESSURE: 131 MMHG

## 2024-04-24 DIAGNOSIS — D45 POLYCYTHEMIA VERA (MULTI): Primary | ICD-10-CM

## 2024-04-24 DIAGNOSIS — Z79.01 ON CONTINUOUS ORAL ANTICOAGULATION: ICD-10-CM

## 2024-04-24 DIAGNOSIS — D75.1 POLYCYTHEMIA, SECONDARY: ICD-10-CM

## 2024-04-24 DIAGNOSIS — D45 POLYCYTHEMIA VERA (MULTI): ICD-10-CM

## 2024-04-24 DIAGNOSIS — E61.1 IRON DEFICIENCY: ICD-10-CM

## 2024-04-24 DIAGNOSIS — I73.9 CLAUDICATION, INTERMITTENT (CMS-HCC): ICD-10-CM

## 2024-04-24 DIAGNOSIS — R94.31 ABNORMAL EKG: Primary | ICD-10-CM

## 2024-04-24 LAB
ALBUMIN SERPL BCP-MCNC: 4.4 G/DL (ref 3.4–5)
ALP SERPL-CCNC: 55 U/L (ref 33–120)
ALT SERPL W P-5'-P-CCNC: 23 U/L (ref 10–52)
ANION GAP SERPL CALC-SCNC: 14 MMOL/L (ref 10–20)
APTT PPP: 45 SECONDS (ref 27–38)
AST SERPL W P-5'-P-CCNC: 18 U/L (ref 9–39)
BASOPHILS # BLD AUTO: 0.04 X10*3/UL (ref 0–0.1)
BASOPHILS NFR BLD AUTO: 0.5 %
BILIRUB SERPL-MCNC: 0.6 MG/DL (ref 0–1.2)
BUN SERPL-MCNC: 13 MG/DL (ref 6–23)
CALCIUM SERPL-MCNC: 9.2 MG/DL (ref 8.6–10.3)
CHLORIDE SERPL-SCNC: 100 MMOL/L (ref 98–107)
CO2 SERPL-SCNC: 26 MMOL/L (ref 21–32)
CREAT SERPL-MCNC: 1 MG/DL (ref 0.5–1.3)
EGFRCR SERPLBLD CKD-EPI 2021: >90 ML/MIN/1.73M*2
EOSINOPHIL # BLD AUTO: 0.24 X10*3/UL (ref 0–0.7)
EOSINOPHIL NFR BLD AUTO: 3 %
ERYTHROCYTE [DISTWIDTH] IN BLOOD BY AUTOMATED COUNT: 15.2 % (ref 11.5–14.5)
FERRITIN SERPL-MCNC: 37 NG/ML (ref 20–300)
FIBRINOGEN PPP-MCNC: 500 MG/DL (ref 200–400)
GLUCOSE SERPL-MCNC: 132 MG/DL (ref 74–99)
HCT VFR BLD AUTO: 49.4 % (ref 41–52)
HGB BLD-MCNC: 15.9 G/DL (ref 13.5–17.5)
IMM GRANULOCYTES # BLD AUTO: 0.03 X10*3/UL (ref 0–0.7)
IMM GRANULOCYTES NFR BLD AUTO: 0.4 % (ref 0–0.9)
INR PPP: 2.5 (ref 0.9–1.1)
IRON SATN MFR SERPL: 13 % (ref 25–45)
IRON SERPL-MCNC: 51 UG/DL (ref 35–150)
LYMPHOCYTES # BLD AUTO: 2.59 X10*3/UL (ref 1.2–4.8)
LYMPHOCYTES NFR BLD AUTO: 32.7 %
MCH RBC QN AUTO: 25.6 PG (ref 26–34)
MCHC RBC AUTO-ENTMCNC: 32.2 G/DL (ref 32–36)
MCV RBC AUTO: 80 FL (ref 80–100)
MONOCYTES # BLD AUTO: 0.6 X10*3/UL (ref 0.1–1)
MONOCYTES NFR BLD AUTO: 7.6 %
NEUTROPHILS # BLD AUTO: 4.41 X10*3/UL (ref 1.2–7.7)
NEUTROPHILS NFR BLD AUTO: 55.8 %
NRBC BLD-RTO: 0 /100 WBCS (ref 0–0)
PLATELET # BLD AUTO: 234 X10*3/UL (ref 150–450)
POTASSIUM SERPL-SCNC: 3.8 MMOL/L (ref 3.5–5.3)
PROT SERPL-MCNC: 8 G/DL (ref 6.4–8.2)
PROTHROMBIN TIME: 28 SECONDS (ref 9.8–12.8)
RBC # BLD AUTO: 6.21 X10*6/UL (ref 4.5–5.9)
SODIUM SERPL-SCNC: 136 MMOL/L (ref 136–145)
TIBC SERPL-MCNC: 393 UG/DL (ref 240–445)
UIBC SERPL-MCNC: 342 UG/DL (ref 110–370)
WBC # BLD AUTO: 7.9 X10*3/UL (ref 4.4–11.3)

## 2024-04-24 PROCEDURE — 85025 COMPLETE CBC W/AUTO DIFF WBC: CPT

## 2024-04-24 PROCEDURE — 99215 OFFICE O/P EST HI 40 MIN: CPT | Performed by: NURSE PRACTITIONER

## 2024-04-24 PROCEDURE — 83540 ASSAY OF IRON: CPT

## 2024-04-24 PROCEDURE — 3078F DIAST BP <80 MM HG: CPT | Performed by: INTERNAL MEDICINE

## 2024-04-24 PROCEDURE — 85610 PROTHROMBIN TIME: CPT

## 2024-04-24 PROCEDURE — 82728 ASSAY OF FERRITIN: CPT

## 2024-04-24 PROCEDURE — 1036F TOBACCO NON-USER: CPT | Performed by: NURSE PRACTITIONER

## 2024-04-24 PROCEDURE — 80053 COMPREHEN METABOLIC PANEL: CPT

## 2024-04-24 PROCEDURE — 3079F DIAST BP 80-89 MM HG: CPT | Performed by: NURSE PRACTITIONER

## 2024-04-24 PROCEDURE — 85730 THROMBOPLASTIN TIME PARTIAL: CPT

## 2024-04-24 PROCEDURE — 99195 PHLEBOTOMY: CPT

## 2024-04-24 PROCEDURE — 3074F SYST BP LT 130 MM HG: CPT | Performed by: INTERNAL MEDICINE

## 2024-04-24 PROCEDURE — 36415 COLL VENOUS BLD VENIPUNCTURE: CPT

## 2024-04-24 PROCEDURE — 99214 OFFICE O/P EST MOD 30 MIN: CPT | Performed by: INTERNAL MEDICINE

## 2024-04-24 PROCEDURE — 3075F SYST BP GE 130 - 139MM HG: CPT | Performed by: NURSE PRACTITIONER

## 2024-04-24 PROCEDURE — 85384 FIBRINOGEN ACTIVITY: CPT

## 2024-04-24 RX ORDER — ALBUTEROL SULFATE 0.83 MG/ML
3 SOLUTION RESPIRATORY (INHALATION) AS NEEDED
OUTPATIENT
Start: 2024-05-15

## 2024-04-24 RX ORDER — DIPHENHYDRAMINE HYDROCHLORIDE 50 MG/ML
50 INJECTION INTRAMUSCULAR; INTRAVENOUS AS NEEDED
OUTPATIENT
Start: 2024-05-15

## 2024-04-24 RX ORDER — FAMOTIDINE 10 MG/ML
20 INJECTION INTRAVENOUS ONCE AS NEEDED
OUTPATIENT
Start: 2024-05-15

## 2024-04-24 RX ORDER — EPINEPHRINE 0.3 MG/.3ML
0.3 INJECTION SUBCUTANEOUS EVERY 5 MIN PRN
OUTPATIENT
Start: 2024-05-15

## 2024-04-24 RX ORDER — METOPROLOL SUCCINATE 100 MG/1
100 TABLET, EXTENDED RELEASE ORAL DAILY
Qty: 90 TABLET | Refills: 1 | Status: SHIPPED | OUTPATIENT
Start: 2024-04-24 | End: 2025-04-24

## 2024-04-24 ASSESSMENT — ENCOUNTER SYMPTOMS
NEUROLOGICAL NEGATIVE: 1
OCCASIONAL FEELINGS OF UNSTEADINESS: 0
CARDIOVASCULAR NEGATIVE: 1
GASTROINTESTINAL NEGATIVE: 1
OCCASIONAL FEELINGS OF UNSTEADINESS: 0
CONSTITUTIONAL NEGATIVE: 1
MUSCULOSKELETAL NEGATIVE: 1
DEPRESSION: 0
LOSS OF SENSATION IN FEET: 0
RESPIRATORY NEGATIVE: 1
LOSS OF SENSATION IN FEET: 0
DEPRESSION: 0

## 2024-04-24 ASSESSMENT — PAIN SCALES - GENERAL
PAINLEVEL: 0-NO PAIN
PAINLEVEL: 0-NO PAIN

## 2024-04-24 NOTE — PROGRESS NOTES
Patient seen in infusion for therapeutic phlebotomy, labs within parameters for treatment. Patient tolerated without complications, 500 ml removed per order. VSS. IV removed. Discharged in stable condition.

## 2024-04-24 NOTE — PROGRESS NOTES
Chief Complaint:   Hospital follow up    History Of Present Illness:    .Mr Carrasco returns in follow up from hospital stay.  Denies chest pain, sob, palpitations or pedal edema.  Has tachycardia per ECG 04/08/2024 at [facility].          Last Recorded Vitals:  There were no vitals taken for this visit.     Past Medical History:  No past medical history on file.     Past Surgical History:  No past surgical history on file.    Social History:  Social History     Socioeconomic History    Marital status:      Spouse name: Not on file    Number of children: Not on file    Years of education: Not on file    Highest education level: Not on file   Occupational History    Not on file   Tobacco Use    Smoking status: Never     Passive exposure: Never    Smokeless tobacco: Never   Vaping Use    Vaping status: Not on file   Substance and Sexual Activity    Alcohol use: Not Currently     Comment: social    Drug use: Not Currently     Types: Marijuana    Sexual activity: Yes   Other Topics Concern    Not on file   Social History Narrative    Not on file     Social Determinants of Health     Financial Resource Strain: Low Risk  (9/18/2023)    Overall Financial Resource Strain (CARDIA)     Difficulty of Paying Living Expenses: Not very hard   Food Insecurity: Unknown (4/8/2024)    Received from [facility] Vital Sign     Worried About Running Out of Food in the Last Year: Never true     Ran Out of Food in the Last Year: Not on file   Transportation Needs: No Transportation Needs (9/18/2023)    PRAPARE - Transportation     Lack of Transportation (Medical): No     Lack of Transportation (Non-Medical): No   Physical Activity: Sufficiently Active (9/18/2023)    Exercise Vital Sign     Days of Exercise per Week: 5 days     Minutes of Exercise per Session: 50 min   Stress: No Stress Concern Present (9/18/2023)    [place] [employer] - Occupational Stress Questionnaire     Feeling of Stress : Not at all    Social Connections: Unknown (9/18/2023)    Social Connection and Isolation Panel [NHANES]     Frequency of Communication with Friends and Family: More than three times a week     Frequency of Social Gatherings with Friends and Family: More than three times a week     Attends Rastafarian Services: More than 4 times per year     Active Member of Clubs or Organizations: Patient declined     Attends Club or Organization Meetings: Patient declined     Marital Status: Patient declined   Intimate Partner Violence: Not At Risk (9/18/2023)    Humiliation, Afraid, Rape, and Kick questionnaire     Fear of Current or Ex-Partner: No     Emotionally Abused: No     Physically Abused: No     Sexually Abused: No   Housing Stability: Unknown (9/18/2023)    Housing Stability Vital Sign     Unable to Pay for Housing in the Last Year: No     Number of Places Lived in the Last Year: Not on file     Unstable Housing in the Last Year: No       Family History:  No family history on file.      Allergies:  Ibuprofen    Outpatient Medications:  Current Outpatient Medications   Medication Sig Dispense Refill    allopurinol (Zyloprim) 100 mg tablet Take 1 tablet (100 mg) by mouth once daily. 90 tablet 3    amLODIPine (Norvasc) 5 mg tablet TAKE 1 TABLET BY MOUTH EVERY DAY 90 tablet 3    coenzyme Q-10 200 mg capsule Take 1 capsule (200 mg) by mouth once daily.      coenzyme Q10-vitamin E 100-5 mg-unit capsule Take 1 capsule by mouth 2 times a day.      fluocinolone (DermOtic) 0.01 % ear drops Instill 2 to 3 drops to right ear canal twice daily x 7 days then use as needed for itching/flaking twice daily x 3 days. (Patient not taking: Reported on 1/31/2024) 20 mL 2    hydroCHLOROthiazide (HYDRODiuril) 25 mg tablet TAKE 1 TABLET BY MOUTH EVERY DAY 90 tablet 3    metFORMIN (Glucophage) 500 mg tablet Take 1 tablet (500 mg) by mouth 2 times a day with meals. 180 tablet 2    methocarbamol (Robaxin) 750 mg tablet Take 1 tablet (750 mg) by mouth once daily  at bedtime. (Patient taking differently: Take 1 tablet (750 mg) by mouth once daily as needed.) 90 tablet 1    methocarbamol (Robaxin) 750 mg tablet Take 1 tablet (750 mg) by mouth as needed at bedtime.      mometasone (Elocon) 0.1 % cream Apply thin film to affected area twice daily x 7 days then use as needed for itching/flaking skin (Patient not taking: Reported on 4/10/2024) 15 g 1    multivitamin tablet Take 1 tablet by mouth once daily.      neomycin-polymyxin-HC (Cortisporin) otic solution Administer 2 drops into the right ear 4 times a day. Warm bottle in hands before instilling (Patient not taking: Reported on 1/9/2024) 10 mL 3    warfarin (Coumadin) 5 mg tablet Take as directed per INR results 1 to 2 tabs a day 180 tablet 0     No current facility-administered medications for this visit.        Physical Exam:  Cardiovascular:      PMI at left midclavicular line. Normal rate. Regular rhythm. Normal S1. Normal S2.       Murmurs: There is no murmur.      No gallop.  No click. No rub.   Pulses:     Intact distal pulses.   Edema:     Peripheral edema absent.         ROS:  Review of Systems   Constitutional: Negative.   Cardiovascular: Negative.    Respiratory: Negative.     Skin: Negative.    Musculoskeletal: Negative.    Gastrointestinal: Negative.    Genitourinary: Negative.    Neurological: Negative.           Last Labs:  CBC -  Lab Results   Component Value Date    WBC 7.0 01/31/2024    HGB 16.9 01/31/2024    HCT 52.9 (H) 01/31/2024    MCV 79 (L) 01/31/2024     01/31/2024       CMP -  Lab Results   Component Value Date    CALCIUM 9.6 01/31/2024    PROT 8.7 (H) 01/31/2024    ALBUMIN 4.8 01/31/2024    AST 20 01/31/2024    ALT 28 01/31/2024    ALKPHOS 66 01/31/2024    BILITOT 0.7 01/31/2024       LIPID PANEL -   Lab Results   Component Value Date    CHOL 217 (H) 09/18/2023    TRIG 238 (H) 06/14/2023    HDL 37.1 (A) 09/18/2023    CHHDL 5.8 (A) 09/18/2023    LDLF 121 (H) 06/14/2023    VLDL 48 (H)  06/14/2023    NHDL 168 06/14/2023       RENAL FUNCTION PANEL -   Lab Results   Component Value Date    GLUCOSE 108 (H) 01/31/2024     01/31/2024    K 3.6 01/31/2024    CL 98 01/31/2024    CO2 29 01/31/2024    ANIONGAP 15 01/31/2024    BUN 16 01/31/2024    CREATININE 1.02 01/31/2024    GFRMALE 78 09/18/2023    CALCIUM 9.6 01/31/2024    ALBUMIN 4.8 01/31/2024        Lab Results   Component Value Date    HGBA1C 6.2 (A) 09/18/2023    HGBA1C 6.4 (H) 05/10/2022         Assessment/Plan   Problem List Items Addressed This Visit    None      1. 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 He is done well on warfarin thus we will continue this.  Given his failure with DOAC in the past, he should not be resumed on that.   2.  CAD.  Patient had coronary artery score of only zero when done 2023.  Echo 2023  CONCLUSIONS:   1. The left ventricular systolic function is normal with a 60-65% estimated ejection fraction.   3.  Lung nodules.  Sees pulmonology.   4.  Hypertension.  Well controlled.   5.  DM.  On metformin.  6.  Tachycardia.  Will start metoprolol succinate 100 mg daily and have him return in six weeks.   7.  Smoking.  Cessation encouraged.    8.  Hyperlipidemia.  Will discuss statin use at next office visit.    Suzie Pineda, APRN-CNP

## 2024-04-25 ENCOUNTER — APPOINTMENT (OUTPATIENT)
Dept: SLEEP MEDICINE | Facility: HOSPITAL | Age: 49
End: 2024-04-25
Payer: COMMERCIAL

## 2024-04-25 NOTE — PROGRESS NOTES
Patient ID: Brett Carrasco is a 48 y.o. male.  Referring Physician: Miguel Bullard MD  [id number] [place] [address]  Primary Care Provider: Garrett Lynn MD  Visit Type: Follow Up      Subjective    HPI   This is the 4th visit for this 47 yo male who had multiple DVT and PEs on in , developed a new PE while taking Xarelto 20 mg by mouth daily.  He was first seen here in 2021 by Dr. Latham as someone who moved to [place] from [place], comes to establish care with me for a long history of recurrent DVT and PE (6-7 since his 30's).  His anticoagulation was between warfarin or Lovenox, but discontinued the latter due o insurance issues.  Recurrent  clots occurred when off anticoagulation.       His work up is positive for lupus anticoagulant but negative for antiphospholipid antibodies, antithrombin III, protein C and S, factor V leiden and prothrombin gene mutation.  In 2021 on Xarelto he was doing well.     About a year later, a new DVT occurred while on Xarelto.  Clot is documented by doppler studies.  He seen by vascular surgery and cardiology and was started on warfarin.  Doing well on warfarin; INRs are stable.  His warfain is monitored by [facility].      My work-up 2 years ago showed several issues.  He has polycythemia that needs an evaluation; Also, he had an elevated UA; Last, he had an elevated total protein.  We started an additional work-up.  I had arranged a phlebotomy schedule for him, but he misses appointments and when he does, his hematocrit rises to the low 50s.       PAST MEDICAL HISTORY  recurrent thrombosis  lupus anticoagulant positive     Past Surgical History  none     FAMILY HISTORY; father  at 57 yo; had heart failure, previous hx of thrombosis and was on warfarin.  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   has no past surgical history on file.  No family history on file.  Oncology History    No history exists.       Brett Carrasco  reports that he has never smoked. He has never been exposed to tobacco smoke. He has never used smokeless tobacco.  He  reports that he does not currently use alcohol.  He  reports that he does not currently use drugs after having used the following drugs: Marijuana.    Physical ExamConstitutional:       Appearance: Normal appearance. He is obese.   HENT:      Head: Normocephalic and atraumatic.      Nose: Nose normal.      Mouth/Throat:      Mouth: Mucous membranes are dry.      Pharynx: Oropharynx is clear.   Cardiovascular:      Rate and Rhythm: Normal rate and regular rhythm.      Pulses: Normal pulses.      Heart sounds: Normal heart sounds.   Pulmonary:      Effort: Pulmonary effort is normal.      Breath sounds: Normal breath sounds.   Abdominal:      General: There is distension.   Musculoskeletal:         General: Normal range of motion.      Cervical back: Normal range of motion and neck supple.   Skin:     General: Skin is warm and dry.   Neurological:      General: No focal deficit present.      Mental Status: He is oriented to person, place, and time.   Psychiatric:         Mood and Affect: Mood normal.         Behavior: Behavior normal.         Thought Content: Thought content normal.         Judgment: Judgment normal.            WBC   Date/Time Value Ref Range Status   04/24/2024 03:23 PM 7.9 4.4 - 11.3 x10*3/uL Final   01/31/2024 02:46 PM 7.0 4.4 - 11.3 x10*3/uL Final   10/11/2023 01:56 PM 7.0 4.4 - 11.3 x10*3/uL Final     nRBC   Date  Value Ref Range Status   04/24/2024 0.0 0.0 - 0.0 /100 WBCs Final   01/31/2024 0.0 0.0 - 0.0 /100 WBCs Final   10/11/2023 0.0 0.0 - 0.0 /100 WBCs Final     RBC   Date Value Ref Range Status   04/24/2024 6.21 (H) 4.50 - 5.90 x10*6/uL Final   01/31/2024 6.70 (H) 4.50 - 5.90 x10*6/uL Final   10/11/2023 6.12 (H) 4.50 - 5.90 x10*6/uL Final     Hemoglobin   Date Value Ref Range Status   04/24/2024 15.9 13.5 - 17.5 g/dL Final   01/31/2024 16.9 13.5 - 17.5 g/dL Final   10/11/2023 15.1 13.5 - 17.5 g/dL Final     Hematocrit   Date Value Ref Range Status   04/24/2024 49.4 41.0 - 52.0 % Final   01/31/2024 52.9 (H) 41.0 - 52.0 % Final   10/11/2023 47.5 41.0 - 52.0 % Final     MCV   Date/Time Value Ref Range Status   04/24/2024 03:23 PM 80 80 - 100 fL Final   01/31/2024 02:46 PM 79 (L) 80 - 100 fL Final   10/11/2023 01:56 PM 78 (L) 80 - 100 fL Final     MCH   Date/Time Value Ref Range Status   04/24/2024 03:23 PM 25.6 (L) 26.0 - 34.0 pg Final   01/31/2024 02:46 PM 25.2 (L) 26.0 - 34.0 pg Final   10/11/2023 01:56 PM 24.7 (L) 26.0 - 34.0 pg Final     MCHC   Date/Time Value Ref Range Status   04/24/2024 03:23 PM 32.2 32.0 - 36.0 g/dL Final   01/31/2024 02:46 PM 31.9 (L) 32.0 - 36.0 g/dL Final   10/11/2023 01:56 PM 31.8 (L) 32.0 - 36.0 g/dL Final     RDW   Date/Time Value Ref Range Status   04/24/2024 03:23 PM 15.2 (H) 11.5 - 14.5 % Final   01/31/2024 02:46 PM 17.7 (H) 11.5 - 14.5 % Final   10/11/2023 01:56 PM 16.8 (H) 11.5 - 14.5 % Final     Platelets   Date/Time Value Ref Range Status   04/24/2024 03:23  150 - 450 x10*3/uL Final   01/31/2024 02:46  150 - 450 x10*3/uL Final   10/11/2023 01:56  150 - 450 x10*3/uL Final     MPV   Date/Time Value Ref Range Status   10/11/2023 01:56 PM 9.0 7.5 - 11.5 fL Final     Neutrophils %   Date/Time Value Ref Range Status   04/24/2024 03:23 PM 55.8 40.0 - 80.0 % Final   01/31/2024 02:46 PM 53.5 40.0 - 80.0 % Final   10/11/2023 01:56 PM 55.2 40.0 - 80.0 % Final     Immature  Granulocytes %, Automated   Date/Time Value Ref Range Status   04/24/2024 03:23 PM 0.4 0.0 - 0.9 % Final     Comment:     Immature Granulocyte Count (IG) includes promyelocytes, myelocytes and metamyelocytes but does not include bands. Percent differential counts (%) should be interpreted in the context of the absolute cell counts (cells/UL).   01/31/2024 02:46 PM 0.3 0.0 - 0.9 % Final     Comment:     Immature Granulocyte Count (IG) includes promyelocytes, myelocytes and metamyelocytes but does not include bands. Percent differential counts (%) should be interpreted in the context of the absolute cell counts (cells/UL).   10/11/2023 01:56 PM 0.3 0.0 - 0.9 % Final     Comment:     Immature Granulocyte Count (IG) includes promyelocytes, myelocytes and metamyelocytes but does not include bands. Percent differential counts (%) should be interpreted in the context of the absolute cell counts (cells/UL).     Lymphocytes %   Date/Time Value Ref Range Status   04/24/2024 03:23 PM 32.7 13.0 - 44.0 % Final   01/31/2024 02:46 PM 34.7 13.0 - 44.0 % Final   10/11/2023 01:56 PM 31.6 13.0 - 44.0 % Final     Monocytes %   Date/Time Value Ref Range Status   04/24/2024 03:23 PM 7.6 2.0 - 10.0 % Final   01/31/2024 02:46 PM 8.0 2.0 - 10.0 % Final   10/11/2023 01:56 PM 7.6 2.0 - 10.0 % Final     Eosinophils %   Date/Time Value Ref Range Status   04/24/2024 03:23 PM 3.0 0.0 - 6.0 % Final   01/31/2024 02:46 PM 2.9 0.0 - 6.0 % Final   10/11/2023 01:56 PM 4.7 0.0 - 6.0 % Final     Basophils %   Date/Time Value Ref Range Status   04/24/2024 03:23 PM 0.5 0.0 - 2.0 % Final   01/31/2024 02:46 PM 0.6 0.0 - 2.0 % Final   10/11/2023 01:56 PM 0.6 0.0 - 2.0 % Final     Neutrophils Absolute   Date/Time Value Ref Range Status   04/24/2024 03:23 PM 4.41 1.20 - 7.70 x10*3/uL Final     Comment:     Percent differential counts (%) should be interpreted in the context of the absolute cell counts (cells/uL).   01/31/2024 02:46 PM 3.74 1.20 - 7.70 x10*3/uL  "Final     Comment:     Percent differential counts (%) should be interpreted in the context of the absolute cell counts (cells/uL).   10/11/2023 01:56 PM 3.86 1.20 - 7.70 x10*3/uL Final     Comment:     Percent differential counts (%) should be interpreted in the context of the absolute cell counts (cells/uL).     Immature Granulocytes Absolute, Automated   Date/Time Value Ref Range Status   04/24/2024 03:23 PM 0.03 0.00 - 0.70 x10*3/uL Final   01/31/2024 02:46 PM 0.02 0.00 - 0.70 x10*3/uL Final   10/11/2023 01:56 PM 0.02 0.00 - 0.70 x10*3/uL Final     Lymphocytes Absolute   Date/Time Value Ref Range Status   04/24/2024 03:23 PM 2.59 1.20 - 4.80 x10*3/uL Final   01/31/2024 02:46 PM 2.42 1.20 - 4.80 x10*3/uL Final   10/11/2023 01:56 PM 2.21 1.20 - 4.80 x10*3/uL Final     Monocytes Absolute   Date/Time Value Ref Range Status   04/24/2024 03:23 PM 0.60 0.10 - 1.00 x10*3/uL Final   01/31/2024 02:46 PM 0.56 0.10 - 1.00 x10*3/uL Final   10/11/2023 01:56 PM 0.53 0.10 - 1.00 x10*3/uL Final     Eosinophils Absolute   Date/Time Value Ref Range Status   04/24/2024 03:23 PM 0.24 0.00 - 0.70 x10*3/uL Final   01/31/2024 02:46 PM 0.20 0.00 - 0.70 x10*3/uL Final   10/11/2023 01:56 PM 0.33 0.00 - 0.70 x10*3/uL Final     Basophils Absolute   Date/Time Value Ref Range Status   04/24/2024 03:23 PM 0.04 0.00 - 0.10 x10*3/uL Final   01/31/2024 02:46 PM 0.04 0.00 - 0.10 x10*3/uL Final   10/11/2023 01:56 PM 0.04 0.00 - 0.10 x10*3/uL Final       No components found for: \"PT\"  aPTT   Date/Time Value Ref Range Status   04/24/2024 03:23 PM 45 (H) 27 - 38 seconds Final   02/01/2023 04:54 PM 50 (H) 26 - 39 sec Final     Comment:       THE APTT IS NO LONGER USED FOR MONITORING     UNFRACTIONATED HEPARIN THERAPY.    FOR MONITORING HEPARIN THERAPY,     USE THE HEPARIN ASSAY.     01/18/2023 11:24 AM 45 (H) 26 - 39 sec Final     Comment:       THE APTT IS NO LONGER USED FOR MONITORING     UNFRACTIONATED HEPARIN THERAPY.    FOR MONITORING HEPARIN " THERAPY,     USE THE HEPARIN ASSAY.       Labs:  Hematocrit is 49.1%; Hgb=15, Fibrinogen 500, INR=2.5, aPTT=45; glucose=132    Assessment/Plan      We arranged for him to get a phlebotomy today.  He needs to come monthly.  It is important for Mr. Carrasco to realize that keeping his hematocrit down lowers his thrombosis risk.  The iron deficiency he has is expected.  Note: an elevated fibrinogen at 500 mg/dl is also a risk factor for thrombosis.    RTC 1 year, monthly phlebotomies scheduled.     Diagnoses and all orders for this visit:    -     CBC and Auto Differential; Future  -     Clinic Appointment Request [facility]; Future  -     Infusion Appointment Request [facility]; Future  -     Infusion Appointment Request [facility]; Future  -     Infusion Appointment Request [facility]; Future  -     Infusion Appointment Request [facility]; Future  -     Infusion Appointment Request [facility]; Future  -     Infusion Appointment Request [facility]; Future  Polycythemia, secondary  -     Clinic Appointment Request [facility]  -     aPTT; Future  -     Protime-INR; Future  -     Fibrinogen; Future  -     CBC and Auto Differential; Future  -     Comprehensive Metabolic Panel; Future  -     Ferritin; Future  -     Iron and TIBC; Future  Iron deficiency-expected with phlebotomies  -     aPTT; Future  -     Protime-INR; Future  -     Fibrinogen; Future  -     CBC and Auto Differential; Future  -     Comprehensive Metabolic Panel; Future  -     Ferritin; Future  -     Iron and TIBC; Future  On continuous oral anticoagulation-warfarin, INR=2.5  -     aPTT; Future  -     Protime-INR; Future  -     Fibrinogen; Future  -     CBC and Auto Differential; Future  -     Comprehensive Metabolic Panel; Future  -     Ferritin; Future  -     Iron and TIBC; Future  Claudication, intermittent (CMS-HCC)-metabolic vascular disease from DM and abnormal lipids.         Mary Bullard,  MD

## 2024-05-12 DIAGNOSIS — E11.9 TYPE 2 DIABETES MELLITUS WITHOUT COMPLICATION, WITHOUT LONG-TERM CURRENT USE OF INSULIN (MULTI): ICD-10-CM

## 2024-05-13 RX ORDER — METFORMIN HYDROCHLORIDE 500 MG/1
500 TABLET ORAL
Qty: 180 TABLET | Refills: 2 | Status: SHIPPED | OUTPATIENT
Start: 2024-05-13

## 2024-05-22 ENCOUNTER — APPOINTMENT (OUTPATIENT)
Dept: HEMATOLOGY/ONCOLOGY | Facility: HOSPITAL | Age: 49
End: 2024-05-22
Payer: COMMERCIAL

## 2024-06-10 DIAGNOSIS — I73.9 CLAUDICATION, INTERMITTENT (CMS-HCC): ICD-10-CM

## 2024-06-10 RX ORDER — WARFARIN SODIUM 5 MG/1
TABLET ORAL
Qty: 180 TABLET | Refills: 0 | Status: SHIPPED | OUTPATIENT
Start: 2024-06-10

## 2024-06-19 ENCOUNTER — INFUSION (OUTPATIENT)
Dept: HEMATOLOGY/ONCOLOGY | Facility: HOSPITAL | Age: 49
End: 2024-06-19
Payer: COMMERCIAL

## 2024-06-19 ENCOUNTER — LAB (OUTPATIENT)
Dept: LAB | Facility: HOSPITAL | Age: 49
End: 2024-06-19
Payer: COMMERCIAL

## 2024-06-19 VITALS
TEMPERATURE: 97.5 F | DIASTOLIC BLOOD PRESSURE: 61 MMHG | HEART RATE: 65 BPM | OXYGEN SATURATION: 99 % | RESPIRATION RATE: 18 BRPM | WEIGHT: 240.96 LBS | SYSTOLIC BLOOD PRESSURE: 119 MMHG | BODY MASS INDEX: 35.58 KG/M2

## 2024-06-19 DIAGNOSIS — D45 POLYCYTHEMIA VERA (MULTI): ICD-10-CM

## 2024-06-19 DIAGNOSIS — D75.1 POLYCYTHEMIA, SECONDARY: ICD-10-CM

## 2024-06-19 LAB
ALBUMIN SERPL BCP-MCNC: 4 G/DL (ref 3.4–5)
ALP SERPL-CCNC: 49 U/L (ref 33–120)
ALT SERPL W P-5'-P-CCNC: 20 U/L (ref 10–52)
ANION GAP SERPL CALC-SCNC: 13 MMOL/L (ref 10–20)
AST SERPL W P-5'-P-CCNC: 16 U/L (ref 9–39)
BASOPHILS # BLD AUTO: 0.03 X10*3/UL (ref 0–0.1)
BASOPHILS NFR BLD AUTO: 0.5 %
BILIRUB SERPL-MCNC: 0.5 MG/DL (ref 0–1.2)
BUN SERPL-MCNC: 15 MG/DL (ref 6–23)
CALCIUM SERPL-MCNC: 8.5 MG/DL (ref 8.6–10.3)
CHLORIDE SERPL-SCNC: 103 MMOL/L (ref 98–107)
CO2 SERPL-SCNC: 27 MMOL/L (ref 21–32)
CREAT SERPL-MCNC: 1.03 MG/DL (ref 0.5–1.3)
EGFRCR SERPLBLD CKD-EPI 2021: >90 ML/MIN/1.73M*2
EOSINOPHIL # BLD AUTO: 0.28 X10*3/UL (ref 0–0.7)
EOSINOPHIL NFR BLD AUTO: 4.2 %
ERYTHROCYTE [DISTWIDTH] IN BLOOD BY AUTOMATED COUNT: 14.8 % (ref 11.5–14.5)
FERRITIN SERPL-MCNC: 29 NG/ML (ref 20–300)
GLUCOSE SERPL-MCNC: 169 MG/DL (ref 74–99)
HCT VFR BLD AUTO: 46.4 % (ref 41–52)
HGB BLD-MCNC: 15 G/DL (ref 13.5–17.5)
IMM GRANULOCYTES # BLD AUTO: 0.01 X10*3/UL (ref 0–0.7)
IMM GRANULOCYTES NFR BLD AUTO: 0.2 % (ref 0–0.9)
LYMPHOCYTES # BLD AUTO: 2.13 X10*3/UL (ref 1.2–4.8)
LYMPHOCYTES NFR BLD AUTO: 32.3 %
MCH RBC QN AUTO: 26.3 PG (ref 26–34)
MCHC RBC AUTO-ENTMCNC: 32.3 G/DL (ref 32–36)
MCV RBC AUTO: 81 FL (ref 80–100)
MONOCYTES # BLD AUTO: 0.52 X10*3/UL (ref 0.1–1)
MONOCYTES NFR BLD AUTO: 7.9 %
NEUTROPHILS # BLD AUTO: 3.63 X10*3/UL (ref 1.2–7.7)
NEUTROPHILS NFR BLD AUTO: 54.9 %
NRBC BLD-RTO: 0 /100 WBCS (ref 0–0)
PLATELET # BLD AUTO: 236 X10*3/UL (ref 150–450)
POTASSIUM SERPL-SCNC: 3.9 MMOL/L (ref 3.5–5.3)
PROT SERPL-MCNC: 6.9 G/DL (ref 6.4–8.2)
RBC # BLD AUTO: 5.7 X10*6/UL (ref 4.5–5.9)
SODIUM SERPL-SCNC: 139 MMOL/L (ref 136–145)
WBC # BLD AUTO: 6.6 X10*3/UL (ref 4.4–11.3)

## 2024-06-19 PROCEDURE — 36415 COLL VENOUS BLD VENIPUNCTURE: CPT

## 2024-06-19 PROCEDURE — 84075 ASSAY ALKALINE PHOSPHATASE: CPT

## 2024-06-19 PROCEDURE — 85025 COMPLETE CBC W/AUTO DIFF WBC: CPT

## 2024-06-19 PROCEDURE — 82728 ASSAY OF FERRITIN: CPT

## 2024-06-19 PROCEDURE — 99195 PHLEBOTOMY: CPT

## 2024-06-19 RX ORDER — ALBUTEROL SULFATE 0.83 MG/ML
3 SOLUTION RESPIRATORY (INHALATION) AS NEEDED
OUTPATIENT
Start: 2024-07-15

## 2024-06-19 RX ORDER — DIPHENHYDRAMINE HYDROCHLORIDE 50 MG/ML
50 INJECTION INTRAMUSCULAR; INTRAVENOUS AS NEEDED
OUTPATIENT
Start: 2024-07-15

## 2024-06-19 RX ORDER — HEPARIN 100 UNIT/ML
500 SYRINGE INTRAVENOUS AS NEEDED
OUTPATIENT
Start: 2024-06-19

## 2024-06-19 RX ORDER — FAMOTIDINE 10 MG/ML
20 INJECTION INTRAVENOUS ONCE AS NEEDED
OUTPATIENT
Start: 2024-07-15

## 2024-06-19 RX ORDER — EPINEPHRINE 0.3 MG/.3ML
0.3 INJECTION SUBCUTANEOUS EVERY 5 MIN PRN
OUTPATIENT
Start: 2024-07-15

## 2024-06-19 RX ORDER — HEPARIN SODIUM,PORCINE/PF 10 UNIT/ML
50 SYRINGE (ML) INTRAVENOUS AS NEEDED
OUTPATIENT
Start: 2024-06-19

## 2024-06-19 ASSESSMENT — PAIN SCALES - GENERAL: PAINLEVEL: 0-NO PAIN

## 2024-07-17 ENCOUNTER — INFUSION (OUTPATIENT)
Dept: HEMATOLOGY/ONCOLOGY | Facility: HOSPITAL | Age: 49
End: 2024-07-17
Payer: COMMERCIAL

## 2024-07-17 ENCOUNTER — LAB (OUTPATIENT)
Dept: LAB | Facility: HOSPITAL | Age: 49
End: 2024-07-17
Payer: COMMERCIAL

## 2024-07-17 VITALS
SYSTOLIC BLOOD PRESSURE: 111 MMHG | DIASTOLIC BLOOD PRESSURE: 72 MMHG | HEART RATE: 75 BPM | WEIGHT: 239.86 LBS | HEIGHT: 69 IN | OXYGEN SATURATION: 100 % | BODY MASS INDEX: 35.53 KG/M2 | TEMPERATURE: 97.9 F | RESPIRATION RATE: 18 BRPM

## 2024-07-17 DIAGNOSIS — D75.1 POLYCYTHEMIA, SECONDARY: ICD-10-CM

## 2024-07-17 DIAGNOSIS — D45 POLYCYTHEMIA VERA (MULTI): ICD-10-CM

## 2024-07-17 LAB
ALBUMIN SERPL BCP-MCNC: 4.3 G/DL (ref 3.4–5)
ALP SERPL-CCNC: 55 U/L (ref 33–120)
ALT SERPL W P-5'-P-CCNC: 21 U/L (ref 10–52)
ANION GAP SERPL CALC-SCNC: 13 MMOL/L (ref 10–20)
AST SERPL W P-5'-P-CCNC: 16 U/L (ref 9–39)
BASOPHILS # BLD AUTO: 0.04 X10*3/UL (ref 0–0.1)
BASOPHILS NFR BLD AUTO: 0.5 %
BILIRUB SERPL-MCNC: 0.5 MG/DL (ref 0–1.2)
BUN SERPL-MCNC: 19 MG/DL (ref 6–23)
CALCIUM SERPL-MCNC: 9.1 MG/DL (ref 8.6–10.3)
CHLORIDE SERPL-SCNC: 102 MMOL/L (ref 98–107)
CO2 SERPL-SCNC: 29 MMOL/L (ref 21–32)
CREAT SERPL-MCNC: 1.11 MG/DL (ref 0.5–1.3)
EGFRCR SERPLBLD CKD-EPI 2021: 83 ML/MIN/1.73M*2
EOSINOPHIL # BLD AUTO: 0.33 X10*3/UL (ref 0–0.7)
EOSINOPHIL NFR BLD AUTO: 4.3 %
ERYTHROCYTE [DISTWIDTH] IN BLOOD BY AUTOMATED COUNT: 14.7 % (ref 11.5–14.5)
FERRITIN SERPL-MCNC: 33 NG/ML (ref 20–300)
GLUCOSE SERPL-MCNC: 116 MG/DL (ref 74–99)
HCT VFR BLD AUTO: 46.8 % (ref 41–52)
HGB BLD-MCNC: 15 G/DL (ref 13.5–17.5)
IMM GRANULOCYTES # BLD AUTO: 0.02 X10*3/UL (ref 0–0.7)
IMM GRANULOCYTES NFR BLD AUTO: 0.3 % (ref 0–0.9)
LYMPHOCYTES # BLD AUTO: 2.66 X10*3/UL (ref 1.2–4.8)
LYMPHOCYTES NFR BLD AUTO: 34.9 %
MCH RBC QN AUTO: 25.9 PG (ref 26–34)
MCHC RBC AUTO-ENTMCNC: 32.1 G/DL (ref 32–36)
MCV RBC AUTO: 81 FL (ref 80–100)
MONOCYTES # BLD AUTO: 0.64 X10*3/UL (ref 0.1–1)
MONOCYTES NFR BLD AUTO: 8.4 %
NEUTROPHILS # BLD AUTO: 3.93 X10*3/UL (ref 1.2–7.7)
NEUTROPHILS NFR BLD AUTO: 51.6 %
NRBC BLD-RTO: 0 /100 WBCS (ref 0–0)
PLATELET # BLD AUTO: 234 X10*3/UL (ref 150–450)
POTASSIUM SERPL-SCNC: 4.1 MMOL/L (ref 3.5–5.3)
PROT SERPL-MCNC: 7.8 G/DL (ref 6.4–8.2)
RBC # BLD AUTO: 5.79 X10*6/UL (ref 4.5–5.9)
SODIUM SERPL-SCNC: 140 MMOL/L (ref 136–145)
WBC # BLD AUTO: 7.6 X10*3/UL (ref 4.4–11.3)

## 2024-07-17 PROCEDURE — 99195 PHLEBOTOMY: CPT

## 2024-07-17 PROCEDURE — 36415 COLL VENOUS BLD VENIPUNCTURE: CPT

## 2024-07-17 PROCEDURE — 84075 ASSAY ALKALINE PHOSPHATASE: CPT

## 2024-07-17 PROCEDURE — 85025 COMPLETE CBC W/AUTO DIFF WBC: CPT

## 2024-07-17 PROCEDURE — 82728 ASSAY OF FERRITIN: CPT

## 2024-07-17 RX ORDER — ALBUTEROL SULFATE 0.83 MG/ML
3 SOLUTION RESPIRATORY (INHALATION) AS NEEDED
OUTPATIENT
Start: 2024-08-15

## 2024-07-17 RX ORDER — DIPHENHYDRAMINE HYDROCHLORIDE 50 MG/ML
50 INJECTION INTRAMUSCULAR; INTRAVENOUS AS NEEDED
OUTPATIENT
Start: 2024-08-15

## 2024-07-17 RX ORDER — FAMOTIDINE 10 MG/ML
20 INJECTION INTRAVENOUS ONCE AS NEEDED
OUTPATIENT
Start: 2024-08-15

## 2024-07-17 RX ORDER — EPINEPHRINE 0.3 MG/.3ML
0.3 INJECTION SUBCUTANEOUS EVERY 5 MIN PRN
OUTPATIENT
Start: 2024-08-15

## 2024-07-17 NOTE — PROGRESS NOTES
Pt received phlebotomy as ordered for hematocrit 46.8. Tolerated well, declined observation, drank approx 250mL of water and had snack during procedure. VSS. Pt ambulated out of clinic in stable condition, has schedule for follow up.

## 2024-08-14 ENCOUNTER — TELEPHONE (OUTPATIENT)
Dept: HEMATOLOGY/ONCOLOGY | Facility: HOSPITAL | Age: 49
End: 2024-08-14
Payer: COMMERCIAL

## 2024-09-06 DIAGNOSIS — I73.9 CLAUDICATION, INTERMITTENT (CMS-HCC): ICD-10-CM

## 2024-09-08 RX ORDER — WARFARIN SODIUM 5 MG/1
TABLET ORAL
Qty: 180 TABLET | Refills: 0 | Status: SHIPPED | OUTPATIENT
Start: 2024-09-08

## 2024-09-10 DIAGNOSIS — I73.9 CLAUDICATION, INTERMITTENT (CMS-HCC): ICD-10-CM

## 2024-09-10 RX ORDER — WARFARIN SODIUM 5 MG/1
TABLET ORAL
Qty: 60 TABLET | Refills: 0 | Status: SHIPPED | OUTPATIENT
Start: 2024-09-10

## 2024-09-11 ENCOUNTER — APPOINTMENT (OUTPATIENT)
Dept: HEMATOLOGY/ONCOLOGY | Facility: HOSPITAL | Age: 49
End: 2024-09-11
Payer: COMMERCIAL

## 2024-10-07 ENCOUNTER — APPOINTMENT (OUTPATIENT)
Dept: RADIOLOGY | Facility: HOSPITAL | Age: 49
End: 2024-10-07
Payer: COMMERCIAL

## 2024-10-07 ENCOUNTER — HOSPITAL ENCOUNTER (OUTPATIENT)
Dept: RADIOLOGY | Facility: HOSPITAL | Age: 49
Discharge: HOME | End: 2024-10-07
Payer: COMMERCIAL

## 2024-10-07 DIAGNOSIS — R91.8 LUNG NODULES: ICD-10-CM

## 2024-10-07 PROCEDURE — 71250 CT THORAX DX C-: CPT | Performed by: RADIOLOGY

## 2024-10-07 PROCEDURE — 71250 CT THORAX DX C-: CPT

## 2024-10-09 ENCOUNTER — APPOINTMENT (OUTPATIENT)
Dept: HEMATOLOGY/ONCOLOGY | Facility: HOSPITAL | Age: 49
End: 2024-10-09
Payer: COMMERCIAL

## 2024-10-23 DIAGNOSIS — I10 PRIMARY HYPERTENSION: ICD-10-CM

## 2024-10-23 DIAGNOSIS — M10.9 POLYARTICULAR GOUT: ICD-10-CM

## 2024-10-23 DIAGNOSIS — R94.31 ABNORMAL EKG: ICD-10-CM

## 2024-10-23 RX ORDER — ALLOPURINOL 100 MG/1
100 TABLET ORAL DAILY
Qty: 30 TABLET | Refills: 0 | Status: SHIPPED | OUTPATIENT
Start: 2024-10-23

## 2024-10-23 RX ORDER — AMLODIPINE BESYLATE 5 MG/1
5 TABLET ORAL DAILY
Qty: 90 TABLET | Refills: 3 | Status: SHIPPED | OUTPATIENT
Start: 2024-10-23

## 2024-10-23 RX ORDER — METOPROLOL SUCCINATE 100 MG/1
100 TABLET, EXTENDED RELEASE ORAL DAILY
Qty: 90 TABLET | Refills: 1 | Status: SHIPPED | OUTPATIENT
Start: 2024-10-23 | End: 2025-10-23

## 2024-10-30 ENCOUNTER — OFFICE VISIT (OUTPATIENT)
Dept: PULMONOLOGY | Facility: HOSPITAL | Age: 49
End: 2024-10-30
Payer: COMMERCIAL

## 2024-10-30 VITALS
SYSTOLIC BLOOD PRESSURE: 118 MMHG | TEMPERATURE: 97.6 F | BODY MASS INDEX: 35.55 KG/M2 | WEIGHT: 240 LBS | OXYGEN SATURATION: 94 % | DIASTOLIC BLOOD PRESSURE: 82 MMHG | HEART RATE: 77 BPM

## 2024-10-30 DIAGNOSIS — R91.1 LUNG NODULE: Primary | ICD-10-CM

## 2024-10-30 PROCEDURE — 99213 OFFICE O/P EST LOW 20 MIN: CPT | Mod: GC | Performed by: INTERNAL MEDICINE

## 2024-10-30 PROCEDURE — 3079F DIAST BP 80-89 MM HG: CPT | Performed by: INTERNAL MEDICINE

## 2024-10-30 PROCEDURE — 3074F SYST BP LT 130 MM HG: CPT | Performed by: INTERNAL MEDICINE

## 2024-10-30 PROCEDURE — 99213 OFFICE O/P EST LOW 20 MIN: CPT | Performed by: INTERNAL MEDICINE

## 2024-10-30 RX ORDER — PANTOPRAZOLE SODIUM 40 MG/1
40 TABLET, DELAYED RELEASE ORAL
Qty: 14 TABLET | Refills: 0 | Status: SHIPPED | OUTPATIENT
Start: 2024-10-30 | End: 2024-11-13

## 2024-10-30 ASSESSMENT — PAIN SCALES - GENERAL: PAINLEVEL_OUTOF10: 2

## 2024-10-30 NOTE — PROGRESS NOTES
HPI    48-year-old male follows-up up in pulmonary clinic for evaluation of LLL pulmonary nodule.       Patient appears comfortable at bedside.  Denies any respiratory complaints.  Denies shortness of breath, cough, chest pain, fevers, weight loss or night sweats.  He underwent a CT cardiac scoring without IV contrast which showed 1 cm LLL nodule, which prompted a pulmonary referral.  He underwent a CT chest 4/2024  to evaluate the entire lung parenchyma, found to have 1.3 x1.2 cm LLL pleural-based nodule. CT chest 10/2024 LLL nodule increased in size to 1.7cm     Patient has a history of multiple DVTs and PE.  He is currently getting evaluated by hematology for polycythemia. He gets monthly phlebotomy for polycythemia. His work up is positive for lupus anticoagulant but negative for antiphospholipid antibodies, antithrombin III, protein C and S, factor V leiden and prothrombin gene mutation.  Currently on warfarin.    He reports cough at night with acid reflux getting worse.      PMH: Polycythemia, DVT, hypertension  PSH: None  FH: No family history of lung disease  SH: Never smoker, works as a , has 1 cat at home      Physical Exam     General: Awake and alert. In no acute distress.   Eyes: PERRL. Clear sclera.  ENT: Neck supple. Mucus membranes moist.  Neck: Trachea midline. No JVD.   Respiratory: Equal air entry bilaterally. No added sounds.  Cardiovascular: Regular rate and rhythm. S1S2 heard.  Gastrointestinal: Soft, non-distended. Bowel sounds present.  Neurological: Awake and alert. No focal motor deficits.  Skin: Warm and dry. No rash.  Extremities: No pedal edema.          4/24/2024    11:34 AM 4/24/2024     1:26 PM 6/19/2024     2:24 PM 6/19/2024     3:33 PM 7/17/2024     3:17 PM 7/17/2024     3:53 PM 10/30/2024     3:04 PM   Vitals   Systolic 131 118 103 119 118 111 118   Diastolic 86 78 64 61 71 72 82   Heart Rate 86 94 69 65 70 75 77   Temp  36.3 °C (97.3 °F) 36.4 °C (97.5 °F) 36.4 °C  "(97.5 °F) 36.6 °C (97.9 °F)  36.4 °C (97.6 °F)   Resp  16 18 18 18 18    Height (in) 1.753 m (5' 9\")    1.75 m (5' 8.9\")     Weight (lb) 242.3 236.9 240.96  239.86  240   BMI 35.78 kg/m2 34.98 kg/m2 35.58 kg/m2  35.53 kg/m2  35.55 kg/m2   BSA (m2) 2.31 m2 2.28 m2 2.3 m2  2.3 m2  2.3 m2   Visit Report Report    Report Report    Report     Report            Results     Pulmonary Function Tests:    Pending    Chest Radiograph:  No results found for this or any previous visit from the past 2000 days.      Chest CT Scan:    CT chest 10/2024:        IMPRESSION:  1.  No evidence of acute pathology.  2. Stable to minimal interval increase in size of a lobulated left  lower lobe subpleural nodule compared to 04/25/2024. Given the  appearance and patient's nonsmoking history would recommend a  continued six-month low-dose chest CT for continued surveillance.  Several additional pulmonary nodules all measuring less than 0.6 cm  on all stable compared to 04/25/2024.      CT angio chest w and wo IV contrast 05/09/2022    Narrative  * * *Final Report* * *    DATE OF EXAM: May  9 2022  2:22PM    [facility]   0540  -  CT CHEST W IVCON PE  / ACCESSION #  [id number]    PROCEDURE REASON: PE suspected, high pretest prob    * * * * Physician Interpretation * * * *    RESULT: EXAM: CT OF CHEST, PE PROTOCOL    CLINICAL HISTORY: PE suspected, high pretest prob,, leg swelling    COMPARISON: 7/13/2021 chest CT    TECHNIQUE:  Spiral CT acquisition of the chest from the thoracic inlet to  the upper abdomen following IV contrast.  Axial 1 and 3 mm thick slices  plus coronal and sagittal reformatted images.  MQ:  CTCP_5  Contrast:  84 mL Omnipaque 350 IV  CT Radiation dose: Integrated Dose-Length Product (DLP): 456.7 mGy*cm  CT Dose Reduction Employed: Automated exposure control(AEC) and iterative  recon    RESULT:    Lower neck: Negative.    Pulmonary arteries: Persistent visualization of a minimal area of mural  thickening along the proximal left " lower lobar pulmonary artery branch  just beyond the takeoff of the upper lobe branch which may represent  chronic retracted thrombus. No acute or new embolism detected. Main  pulmonary artery diameter is within normal limits.    Mediastinum: Thoracic aorta negative for aneurysm or dissection. No  mediastinal masses, adenopathy or significant pericardial fluid.    Lungs/Pleura: Persistent small wedge shaped opacity at the posterior left  lung base unchanged from 7/13/2021. No new lung opacities, edema or  pleural effusion.    Airways: Large caliber central airway branches are patent. No mucous  plugging.    Upper abdomen: The visible upper abdominal structures are unremarkable.    Bones/soft tissues: Included skeletal structures are within normal  limits. Chest wall soft tissues are unremarkable.    Lines, tubes, and devices:  None.     (topogram) images: No additional findings.    Limitations:  None.    Impression  IMPRESSION:    1.  No acute or new pulmonary embolism.    2.  Suspect small focus of chronic retracted pulmonary embolism in the  left lower lobe are present.    3.  No acute pulmonary findings.    4.  Persistent small opacity in the left lower posterior lung base  consistent with scarring or atelectasis.          Transcribed Using Voice Recognition  Transcribe Date/Time: May  9 2022  2:49P    Dictated by: CAROLE ACOSTA MD    This examination was interpreted and the report reviewed and  electronically signed by:  CAROLE ACOSTA MD on May  9 2022  3:10PM  EST       ECHO:  No results found for this or any previous visit from the past 365 days.           Lab Results   Component Value Date    RICHAR POSITIVE (A) 01/18/2023    SEDRATE 17 (H) 07/19/2023             Assessment and Plan     48-year-old male with history of multiple DVT/PE on warfarin, being followed in the pulmonary clinic for evaluation of left lower lobe pulmonary nodule.     Assessment:   # LLL nodule, 1.7x 1.2 cm, increasing in size     #GERD/cough    Plan:   -PET/CT whole body now  - PFT  - pantoprazole x 14 days   - follow up in 3 months      I called patient about the PET scan regarding non avid lung nodule. Ordered the CT chest in 6 months for follow up.     Juan Alvarez MD

## 2024-10-30 NOTE — PATIENT INSTRUCTIONS
Thank you for coming into the clinic today. It was a pleasure to see you!     Today we discussed the following:   - PET /CT for left lower lobe nodule  - PFT  - Follow up in 3 months    If you have any further questions feel free to call my office at [phone] (choose #2 to reach a pulmonary nurse) and please allow 1-2 business days for a response.     If you have any scheduling needs feel free to call [phone] (for breathing or walking test), [phone] (for EKG's, echocardiograms or cardiopulmonary stress test), and [phone] (for radiology tests such as CT scan, MRIs and nuclear medicine tests).    Juan Alvarez   Pulmonary and Critical Care Fellow     [address] [place] [address]

## 2024-11-07 ENCOUNTER — APPOINTMENT (OUTPATIENT)
Dept: PRIMARY CARE | Facility: CLINIC | Age: 49
End: 2024-11-07
Payer: COMMERCIAL

## 2024-11-07 VITALS
RESPIRATION RATE: 18 BRPM | WEIGHT: 239.8 LBS | DIASTOLIC BLOOD PRESSURE: 75 MMHG | HEIGHT: 69 IN | BODY MASS INDEX: 35.52 KG/M2 | TEMPERATURE: 98 F | OXYGEN SATURATION: 96 % | HEART RATE: 66 BPM | SYSTOLIC BLOOD PRESSURE: 115 MMHG

## 2024-11-07 DIAGNOSIS — D75.1 POLYCYTHEMIA, SECONDARY: Primary | ICD-10-CM

## 2024-11-07 DIAGNOSIS — I10 PRIMARY HYPERTENSION: ICD-10-CM

## 2024-11-07 DIAGNOSIS — E11.9 TYPE 2 DIABETES MELLITUS WITHOUT COMPLICATION, WITHOUT LONG-TERM CURRENT USE OF INSULIN (MULTI): ICD-10-CM

## 2024-11-07 DIAGNOSIS — E79.0 HYPERURICEMIA: ICD-10-CM

## 2024-11-07 PROCEDURE — 1036F TOBACCO NON-USER: CPT | Performed by: INTERNAL MEDICINE

## 2024-11-07 PROCEDURE — 3074F SYST BP LT 130 MM HG: CPT | Performed by: INTERNAL MEDICINE

## 2024-11-07 PROCEDURE — 3078F DIAST BP <80 MM HG: CPT | Performed by: INTERNAL MEDICINE

## 2024-11-07 PROCEDURE — 99214 OFFICE O/P EST MOD 30 MIN: CPT | Performed by: INTERNAL MEDICINE

## 2024-11-07 PROCEDURE — 3008F BODY MASS INDEX DOCD: CPT | Performed by: INTERNAL MEDICINE

## 2024-11-07 ASSESSMENT — ENCOUNTER SYMPTOMS
PALPITATIONS: 0
WEAKNESS: 0
DIARRHEA: 0
SHORTNESS OF BREATH: 0
MYALGIAS: 0
SORE THROAT: 0
NAUSEA: 0
CHILLS: 0
ACTIVITY CHANGE: 0
AGITATION: 0
SINUS PRESSURE: 0
CHEST TIGHTNESS: 0

## 2024-11-07 ASSESSMENT — PAIN SCALES - GENERAL: PAINLEVEL_OUTOF10: 2

## 2024-11-07 NOTE — PROGRESS NOTES
"Subjective   Patient ID: Brett Carrasco is a 48 y.o. male who presents for Annual Exam.  He went to the ED with palpitations and another ED visit.  He is worried about having lung cancer.  Grandfather had lung cancer.  Mother passed from cancer as well. History of DVT's.  He has polycythemia as well.     HPI     Review of Systems   Constitutional:  Negative for activity change and chills.   HENT:  Negative for congestion, sinus pressure and sore throat.    Respiratory:  Negative for chest tightness and shortness of breath.    Cardiovascular:  Negative for chest pain and palpitations.   Gastrointestinal:  Negative for diarrhea and nausea.   Musculoskeletal:  Negative for myalgias.   Neurological:  Negative for weakness.   Psychiatric/Behavioral:  Negative for agitation and behavioral problems.        Objective   /75 (BP Location: Left arm)   Pulse 66   Temp 36.7 °C (98 °F)   Resp 18   Ht 1.753 m (5' 9\")   Wt 109 kg (239 lb 12.8 oz)   SpO2 96%   BMI 35.41 kg/m²     Physical Exam  Constitutional:       Appearance: Normal appearance.   HENT:      Head: Normocephalic and atraumatic.      Nose: Nose normal.      Mouth/Throat:      Mouth: Mucous membranes are moist.   Eyes:      Extraocular Movements: Extraocular movements intact.   Cardiovascular:      Rate and Rhythm: Normal rate and regular rhythm.   Pulmonary:      Effort: No respiratory distress.      Breath sounds: No wheezing.   Abdominal:      General: Bowel sounds are normal.      Palpations: Abdomen is soft.   Neurological:      General: No focal deficit present.         Assessment/Plan   Assessment & Plan  Polycythemia, secondary  The patient had an issue with the frequency of his blood donation and the fact that he has to pay his significant amount of money to donate blood that is ultimately sold to another patient.  He is okay with having the periodic phlebotomies, but wants to have a better understanding of when is the minimally appropriate time to " have this done.  He has not been able to get a straight answer from the hematologist.  Orders:    CBC; Future    Comprehensive Metabolic Panel; Future    Primary hypertension  Great control.   Orders:    Comprehensive Metabolic Panel; Future    Hyperuricemia  We will recheck this today.  Orders:    Uric Acid; Future    Type 2 diabetes mellitus without complication, without long-term current use of insulin (Multi)  The patient has been doing much better overall.

## 2024-11-07 NOTE — ASSESSMENT & PLAN NOTE
The patient had an issue with the frequency of his blood donation and the fact that he has to pay his significant amount of money to donate blood that is ultimately sold to another patient.  He is okay with having the periodic phlebotomies, but wants to have a better understanding of when is the minimally appropriate time to have this done.  He has not been able to get a straight answer from the hematologist.  Orders:    CBC; Future    Comprehensive Metabolic Panel; Future

## 2024-11-08 ENCOUNTER — LAB (OUTPATIENT)
Dept: LAB | Facility: LAB | Age: 49
End: 2024-11-08
Payer: COMMERCIAL

## 2024-11-08 DIAGNOSIS — E79.0 HYPERURICEMIA: ICD-10-CM

## 2024-11-08 DIAGNOSIS — D75.1 POLYCYTHEMIA, SECONDARY: ICD-10-CM

## 2024-11-08 DIAGNOSIS — I10 PRIMARY HYPERTENSION: ICD-10-CM

## 2024-11-08 LAB
ERYTHROCYTE [DISTWIDTH] IN BLOOD BY AUTOMATED COUNT: 15.9 % (ref 11.5–14.5)
HCT VFR BLD AUTO: 49.5 % (ref 41–52)
HGB BLD-MCNC: 15.3 G/DL (ref 13.5–17.5)
MCH RBC QN AUTO: 25.2 PG (ref 26–34)
MCHC RBC AUTO-ENTMCNC: 30.9 G/DL (ref 32–36)
MCV RBC AUTO: 82 FL (ref 80–100)
NRBC BLD-RTO: 0 /100 WBCS (ref 0–0)
PLATELET # BLD AUTO: 273 X10*3/UL (ref 150–450)
RBC # BLD AUTO: 6.06 X10*6/UL (ref 4.5–5.9)
WBC # BLD AUTO: 8.5 X10*3/UL (ref 4.4–11.3)

## 2024-11-08 PROCEDURE — 84550 ASSAY OF BLOOD/URIC ACID: CPT

## 2024-11-08 PROCEDURE — 85027 COMPLETE CBC AUTOMATED: CPT

## 2024-11-08 PROCEDURE — 36415 COLL VENOUS BLD VENIPUNCTURE: CPT

## 2024-11-08 PROCEDURE — 80053 COMPREHEN METABOLIC PANEL: CPT

## 2024-11-09 LAB
ALBUMIN SERPL BCP-MCNC: 4.5 G/DL (ref 3.4–5)
ALP SERPL-CCNC: 57 U/L (ref 33–120)
ALT SERPL W P-5'-P-CCNC: 19 U/L (ref 10–52)
ANION GAP SERPL CALC-SCNC: 13 MMOL/L (ref 10–20)
AST SERPL W P-5'-P-CCNC: 16 U/L (ref 9–39)
BILIRUB SERPL-MCNC: 0.6 MG/DL (ref 0–1.2)
BUN SERPL-MCNC: 17 MG/DL (ref 6–23)
CALCIUM SERPL-MCNC: 9.6 MG/DL (ref 8.6–10.6)
CHLORIDE SERPL-SCNC: 102 MMOL/L (ref 98–107)
CO2 SERPL-SCNC: 30 MMOL/L (ref 21–32)
CREAT SERPL-MCNC: 1.29 MG/DL (ref 0.5–1.3)
EGFRCR SERPLBLD CKD-EPI 2021: 69 ML/MIN/1.73M*2
GLUCOSE SERPL-MCNC: 113 MG/DL (ref 74–99)
POTASSIUM SERPL-SCNC: 4.1 MMOL/L (ref 3.5–5.3)
PROT SERPL-MCNC: 7.7 G/DL (ref 6.4–8.2)
SODIUM SERPL-SCNC: 141 MMOL/L (ref 136–145)
URATE SERPL-MCNC: 7.3 MG/DL (ref 4–7.5)

## 2024-11-13 RX ORDER — HEPARIN 100 UNIT/ML
500 SYRINGE INTRAVENOUS AS NEEDED
OUTPATIENT
Start: 2024-11-13

## 2024-11-13 RX ORDER — FAMOTIDINE 10 MG/ML
20 INJECTION INTRAVENOUS ONCE AS NEEDED
OUTPATIENT
Start: 2024-11-13

## 2024-11-13 RX ORDER — DIPHENHYDRAMINE HYDROCHLORIDE 50 MG/ML
50 INJECTION INTRAMUSCULAR; INTRAVENOUS AS NEEDED
OUTPATIENT
Start: 2024-11-13

## 2024-11-13 RX ORDER — HEPARIN SODIUM,PORCINE/PF 10 UNIT/ML
50 SYRINGE (ML) INTRAVENOUS AS NEEDED
OUTPATIENT
Start: 2024-11-13

## 2024-11-13 RX ORDER — ALBUTEROL SULFATE 0.83 MG/ML
3 SOLUTION RESPIRATORY (INHALATION) AS NEEDED
OUTPATIENT
Start: 2024-11-13

## 2024-11-13 RX ORDER — EPINEPHRINE 0.3 MG/.3ML
0.3 INJECTION SUBCUTANEOUS EVERY 5 MIN PRN
OUTPATIENT
Start: 2024-11-13

## 2024-11-18 ENCOUNTER — HOSPITAL ENCOUNTER (OUTPATIENT)
Dept: RADIOLOGY | Facility: HOSPITAL | Age: 49
Discharge: HOME | End: 2024-11-18
Payer: COMMERCIAL

## 2024-11-18 DIAGNOSIS — R91.1 LUNG NODULE: ICD-10-CM

## 2024-11-18 LAB — GLUCOSE BLD MANUAL STRIP-MCNC: 177 MG/DL (ref 74–99)

## 2024-11-18 PROCEDURE — 3430000001 HC RX 343 DIAGNOSTIC RADIOPHARMACEUTICALS: Performed by: INTERNAL MEDICINE

## 2024-11-18 PROCEDURE — A9552 F18 FDG: HCPCS | Performed by: INTERNAL MEDICINE

## 2024-11-18 PROCEDURE — 82947 ASSAY GLUCOSE BLOOD QUANT: CPT

## 2024-11-18 PROCEDURE — 78815 PET IMAGE W/CT SKULL-THIGH: CPT | Mod: PI

## 2024-11-18 PROCEDURE — 78815 PET IMAGE W/CT SKULL-THIGH: CPT | Mod: PET TUMOR INIT TX STRAT | Performed by: RADIOLOGY

## 2024-11-18 RX ORDER — FLUDEOXYGLUCOSE F 18 200 MCI/ML
10.15 INJECTION, SOLUTION INTRAVENOUS
Status: COMPLETED | OUTPATIENT
Start: 2024-11-18 | End: 2024-11-18

## 2024-11-24 DIAGNOSIS — M10.9 POLYARTICULAR GOUT: ICD-10-CM

## 2024-11-25 RX ORDER — ALLOPURINOL 100 MG/1
100 TABLET ORAL DAILY
Qty: 90 TABLET | Refills: 3 | OUTPATIENT
Start: 2024-11-25

## 2024-11-25 NOTE — TELEPHONE ENCOUNTER
Apparently there is a contraindication of being on allopurinol and warfarin at the same time.  That is the reason for the rejection.

## 2024-12-25 DIAGNOSIS — I73.9 CLAUDICATION, INTERMITTENT (CMS-HCC): ICD-10-CM

## 2024-12-30 RX ORDER — WARFARIN SODIUM 5 MG/1
TABLET ORAL
Qty: 90 TABLET | Refills: 3 | Status: SHIPPED | OUTPATIENT
Start: 2024-12-30

## 2024-12-30 NOTE — PROGRESS NOTES
The patient requested a refill on the warfarin and the system warned against the coadministration of allopurinol.  So I stopped the allopurinol because the warfarin is far more important to his health.

## 2025-01-23 DIAGNOSIS — I10 PRIMARY HYPERTENSION: ICD-10-CM

## 2025-01-23 RX ORDER — HYDROCHLOROTHIAZIDE 25 MG/1
25 TABLET ORAL DAILY
Qty: 90 TABLET | Refills: 3 | Status: SHIPPED | OUTPATIENT
Start: 2025-01-23

## 2025-02-05 ENCOUNTER — APPOINTMENT (OUTPATIENT)
Dept: PULMONOLOGY | Facility: HOSPITAL | Age: 50
End: 2025-02-05
Payer: COMMERCIAL

## 2025-02-17 ENCOUNTER — OFFICE VISIT (OUTPATIENT)
Dept: PULMONOLOGY | Facility: HOSPITAL | Age: 50
End: 2025-02-17
Payer: COMMERCIAL

## 2025-02-17 VITALS
SYSTOLIC BLOOD PRESSURE: 131 MMHG | DIASTOLIC BLOOD PRESSURE: 82 MMHG | OXYGEN SATURATION: 97 % | BODY MASS INDEX: 35.74 KG/M2 | HEART RATE: 67 BPM | TEMPERATURE: 97.7 F | WEIGHT: 242 LBS

## 2025-02-17 DIAGNOSIS — R91.1 LUNG NODULE: Primary | ICD-10-CM

## 2025-02-17 DIAGNOSIS — Z79.01 ANTICOAGULATED ON WARFARIN: ICD-10-CM

## 2025-02-17 DIAGNOSIS — Z86.718 HISTORY OF RECURRENT DEEP VEIN THROMBOSIS (DVT): ICD-10-CM

## 2025-02-17 DIAGNOSIS — I27.82 OTHER CHRONIC PULMONARY EMBOLISM WITHOUT ACUTE COR PULMONALE: Chronic | ICD-10-CM

## 2025-02-17 DIAGNOSIS — Z86.2 HISTORY OF HYPERCOAGULABLE STATE: ICD-10-CM

## 2025-02-17 PROCEDURE — 99213 OFFICE O/P EST LOW 20 MIN: CPT | Mod: GC | Performed by: INTERNAL MEDICINE

## 2025-02-17 PROCEDURE — 99213 OFFICE O/P EST LOW 20 MIN: CPT | Performed by: INTERNAL MEDICINE

## 2025-02-17 PROCEDURE — 3075F SYST BP GE 130 - 139MM HG: CPT | Performed by: INTERNAL MEDICINE

## 2025-02-17 PROCEDURE — 3079F DIAST BP 80-89 MM HG: CPT | Performed by: INTERNAL MEDICINE

## 2025-02-17 ASSESSMENT — ENCOUNTER SYMPTOMS
PALPITATIONS: 0
HEMATURIA: 0
CHEST TIGHTNESS: 0
DIZZINESS: 0
STRIDOR: 0
DIARRHEA: 0
SINUS PAIN: 0
CHILLS: 0
NAUSEA: 0
DYSURIA: 0
FEVER: 0
DIAPHORESIS: 0
CHOKING: 0
JOINT SWELLING: 0
TROUBLE SWALLOWING: 0
HALLUCINATIONS: 0
RHINORRHEA: 0
ABDOMINAL PAIN: 0
SEIZURES: 0
APNEA: 0
RESPIRATORY NEGATIVE: 1
APPETITE CHANGE: 0
DIFFICULTY URINATING: 0
MYALGIAS: 0
VOMITING: 0
ACTIVITY CHANGE: 0
SORE THROAT: 0
FATIGUE: 0
VOICE CHANGE: 0
HEADACHES: 0

## 2025-02-17 ASSESSMENT — PAIN SCALES - GENERAL: PAINLEVEL_OUTOF10: 0-NO PAIN

## 2025-02-17 NOTE — PROGRESS NOTES
Department of Medicine I Division of Pulmonary, Critical Care, and Sleep Medicine   [id number] [place] [address]  Phone: [phone]  Fax: [phone]    History of Present Illness     49-year-old male follows-up up in pulmonary clinic for evaluation of LLL pulmonary nodule.      Denies any respiratory complaints.  Denies shortness of breath, cough, chest pain, fevers, weight loss or night sweats.  He underwent a CT cardiac scoring without IV contrast which showed 1 cm LLL nodule, which prompted a pulmonary referral.  He underwent a CT chest 4/2024  to evaluate the entire lung parenchyma, found to have 1.3 x1.2 cm LLL pleural-based nodule. CT chest 10/2024 LLL nodule increased in size to 1.7cm     Patient has a history of multiple DVTs and PE.  He is currently getting evaluated by hematology for polycythemia. He gets monthly phlebotomy for polycythemia. His work up is positive for lupus anticoagulant but negative for antiphospholipid antibodies, antithrombin III, protein C and S, factor V leiden and prothrombin gene mutation.  Currently on warfarin.  PMH: Polycythemia, DVT, hypertension  PSH: None  FH: No family history of lung disease  SH: Never smoker, works as a , has 1 cat at home      Current history: 2/17/2025: Denies any respiratory symptoms. PET scan 11/2024: non PET avid nodule      Review of Systems  Review of Systems   Constitutional:  Negative for activity change, appetite change, chills, diaphoresis, fatigue and fever.   HENT:  Negative for congestion, ear discharge, hearing loss, postnasal drip, rhinorrhea, sinus pain, sneezing, sore throat, trouble swallowing and voice change.    Eyes:  Negative for visual disturbance.   Respiratory: Negative.  Negative for apnea, choking, chest tightness and stridor.    Cardiovascular:  Negative for chest pain, palpitations and leg swelling.   Gastrointestinal:  Negative for abdominal pain, diarrhea, nausea and  vomiting.   Genitourinary:  Negative for difficulty urinating, dysuria and hematuria.   Musculoskeletal:  Negative for joint swelling and myalgias.   Skin:  Negative for pallor and rash.   Neurological:  Negative for dizziness, seizures, syncope and headaches.   Psychiatric/Behavioral:  Negative for hallucinations and suicidal ideas.          Past Medical History   No past medical history on file.      Immunizations     Immunization History   Administered Date(s) Administered    Pfizer Purple Cap SARS-CoV-2 06/29/2021, 07/20/2021       Medications and Allergies     Current Outpatient Medications   Medication Instructions    amLODIPine (NORVASC) 5 mg, oral, Daily    coenzyme Q-10 200 mg, Daily RT    fluocinolone (DermOtic) 0.01 % ear drops Instill 2 to 3 drops to right ear canal twice daily x 7 days then use as needed for itching/flaking twice daily x 3 days.    hydroCHLOROthiazide (HYDRODIURIL) 25 mg, oral, Daily    metFORMIN (GLUCOPHAGE) 500 mg, oral, 2 times daily (morning and late afternoon)    methocarbamol (ROBAXIN) 750 mg, Nightly PRN    metoprolol succinate XL (TOPROL-XL) 100 mg, oral, Daily, Do not crush or chew.    multivitamin tablet 1 tablet, Daily    pantoprazole (PROTONIX) 40 mg, oral, Daily before breakfast, Do not crush, chew, or split.    warfarin (Coumadin) 5 mg tablet TAKE AS DIRECTED PER INR RESULTS 1 TO 2 TABS A DAY        Allergies:  Ibuprofen    Physical Exam     Visit Vitals  Wt 110 kg (242 lb)   BMI 35.74 kg/m²   Smoking Status Never   BSA 2.31 m²         Physical Exam    General: Awake and alert. In no acute distress.   Eyes: PERRL. Clear sclera.  ENT: Neck supple. Mucus membranes moist.  Neck: Trachea midline. No JVD.   Respiratory: Equal air entry bilaterally. No added sounds.  Cardiovascular: Regular rate and rhythm. S1S2 heard.  Gastrointestinal: Soft, non-distended. Bowel sounds present.  Neurological: Awake and alert. No focal motor deficits.  Skin: Warm and dry. No rash.  Extremities:  No pedal edema.       Results   Pulmonary Function Tests:  NA      Chest CT Scan:  CT angio chest w and wo IV contrast 05/09/2022    Impression  IMPRESSION:    1.  No acute or new pulmonary embolism.    2.  Suspect small focus of chronic retracted pulmonary embolism in the  left lower lobe are present.    3.  No acute pulmonary findings.    4.  Persistent small opacity in the left lower posterior lung base  consistent with scarring or atelectasis.    CT chest 8 /2023:    Impression   1. Coronary artery calcium score of 0*.  2. Suggestion of a 10 mm left lower lobe nodular density, partially  imaged as described above. Recommend dedicated CT of the chest to  fully include this possible lesion and further assess.          CT chest 10/2024:    IMPRESSION:  1.  No evidence of acute pathology.  2. Stable to minimal interval increase in size of a lobulated left  lower lobe subpleural nodule compared to 04/25/2024. Given the  appearance and patient's nonsmoking history would recommend a  continued six-month low-dose chest CT for continued surveillance.  Several additional pulmonary nodules all measuring less than 0.6 cm  on all stable compared to 04/25/2024.    PET scan 11/2024:    IMPRESSION:  1. Minimal FDG avid left lower lobe subpleural nodule, non-specific.  However low-grade malignant involvement cannot be excluded.  Short-term follow-up with diagnostic CT chest to document interval  resolution/stability is recommended.  2. Otherwise, no FDG avid disease elsewhere.       Labs:  Lab Results   Component Value Date    WBC 8.5 11/08/2024    HGB 15.3 11/08/2024    HCT 49.5 11/08/2024    MCV 82 11/08/2024     11/08/2024       Lab Results   Component Value Date    CREATININE 1.29 11/08/2024    BUN 17 11/08/2024     11/08/2024    K 4.1 11/08/2024     11/08/2024    CO2 30 11/08/2024        Lab Results   Component Value Date    RICHAR POSITIVE (A) 01/18/2023    SEDRATE 17 (H) 07/19/2023        Eosinophils  Absolute (x10*3/uL)   Date Value   07/17/2024 0.33     Eosinophils % (%)   Date Value   07/17/2024 4.3        Assessment and Plan     49-year-old male with history of multiple DVT/PE on warfarin, being followed in the pulmonary clinic for evaluation of left lower lobe pulmonary nodule.     Assessment:   # LLL nodule, 1.7x 1.2 cm, increased in size from April 2024 to oct 2024 by 4mm, PET scan 11 /2024: Non PET avid - less likely malignancy, Hollins score consistent with 5-10% probability of cancer   # non smoker      Plan:    CT Chest in 6 months ( May 2025)    Follow-up:  6 months- virtual appt     Juan Alvarez MD

## 2025-02-17 NOTE — PATIENT INSTRUCTIONS
Thank you for coming into the clinic today. It was a pleasure to see you!     Diagnosis: Lung nodule , LLL 1.7cm    Today we discussed the following:   - CT chest in May 2025      Follow up: Virtual appointment in 6 months     If you have any further questions feel free to call my office at [phone] (choose #2 to reach a pulmonary nurse) and please allow 1-2 business days for a response.     If you have any scheduling needs feel free to call [phone] (for breathing or walking test), [phone] (for EKG's, echocardiograms or cardiopulmonary stress test), and [phone] (for radiology tests such as CT scan, MRIs and nuclear medicine tests).    Juan Alvaerz  Pulmonary and Critical Care Fellow     [address] [place] [address]

## 2025-04-19 DIAGNOSIS — R94.31 ABNORMAL EKG: ICD-10-CM

## 2025-04-19 DIAGNOSIS — E11.9 TYPE 2 DIABETES MELLITUS WITHOUT COMPLICATION, WITHOUT LONG-TERM CURRENT USE OF INSULIN: ICD-10-CM

## 2025-04-21 RX ORDER — METFORMIN HYDROCHLORIDE 500 MG/1
500 TABLET ORAL
Qty: 180 TABLET | Refills: 2 | Status: SHIPPED | OUTPATIENT
Start: 2025-04-21

## 2025-04-21 RX ORDER — METOPROLOL SUCCINATE 100 MG/1
100 TABLET, EXTENDED RELEASE ORAL DAILY
Qty: 30 TABLET | Refills: 0 | Status: SHIPPED | OUTPATIENT
Start: 2025-04-21 | End: 2026-04-21

## 2025-05-07 ENCOUNTER — TELEPHONE (OUTPATIENT)
Dept: CARDIOLOGY | Facility: CLINIC | Age: 50
End: 2025-05-07
Payer: COMMERCIAL

## 2025-05-08 DIAGNOSIS — R94.31 ABNORMAL EKG: ICD-10-CM

## 2025-05-08 DIAGNOSIS — I10 PRIMARY HYPERTENSION: Primary | ICD-10-CM

## 2025-05-08 RX ORDER — METOPROLOL SUCCINATE 100 MG/1
100 TABLET, EXTENDED RELEASE ORAL DAILY
Qty: 90 TABLET | Refills: 3 | Status: SHIPPED | OUTPATIENT
Start: 2025-05-08

## 2025-05-14 DIAGNOSIS — M10.9 POLYARTICULAR GOUT: ICD-10-CM

## 2025-05-14 RX ORDER — ALLOPURINOL 100 MG/1
100 TABLET ORAL DAILY
Qty: 90 TABLET | Refills: 3 | Status: SHIPPED | OUTPATIENT
Start: 2025-05-14

## 2025-05-14 RX ORDER — ALLOPURINOL 100 MG/1
100 TABLET ORAL DAILY
COMMUNITY
End: 2025-05-14 | Stop reason: SDUPTHER

## 2025-06-11 ENCOUNTER — OFFICE VISIT (OUTPATIENT)
Dept: CARDIOLOGY | Facility: CLINIC | Age: 50
End: 2025-06-11
Payer: COMMERCIAL

## 2025-06-11 VITALS
HEART RATE: 85 BPM | SYSTOLIC BLOOD PRESSURE: 121 MMHG | BODY MASS INDEX: 34.2 KG/M2 | HEIGHT: 69 IN | OXYGEN SATURATION: 97 % | WEIGHT: 230.9 LBS | DIASTOLIC BLOOD PRESSURE: 78 MMHG

## 2025-06-11 DIAGNOSIS — E11.9 TYPE 2 DIABETES MELLITUS WITHOUT COMPLICATION, WITHOUT LONG-TERM CURRENT USE OF INSULIN: ICD-10-CM

## 2025-06-11 DIAGNOSIS — E78.5 HYPERLIPIDEMIA, UNSPECIFIED HYPERLIPIDEMIA TYPE: Primary | ICD-10-CM

## 2025-06-11 DIAGNOSIS — I27.82 OTHER CHRONIC PULMONARY EMBOLISM WITHOUT ACUTE COR PULMONALE: Chronic | ICD-10-CM

## 2025-06-11 PROCEDURE — 3074F SYST BP LT 130 MM HG: CPT | Performed by: NURSE PRACTITIONER

## 2025-06-11 PROCEDURE — 99214 OFFICE O/P EST MOD 30 MIN: CPT | Performed by: NURSE PRACTITIONER

## 2025-06-11 PROCEDURE — 1036F TOBACCO NON-USER: CPT | Performed by: NURSE PRACTITIONER

## 2025-06-11 PROCEDURE — 99212 OFFICE O/P EST SF 10 MIN: CPT | Performed by: NURSE PRACTITIONER

## 2025-06-11 PROCEDURE — 3078F DIAST BP <80 MM HG: CPT | Performed by: NURSE PRACTITIONER

## 2025-06-11 PROCEDURE — 3008F BODY MASS INDEX DOCD: CPT | Performed by: NURSE PRACTITIONER

## 2025-06-11 ASSESSMENT — ENCOUNTER SYMPTOMS
CARDIOVASCULAR NEGATIVE: 1
OCCASIONAL FEELINGS OF UNSTEADINESS: 0
RESPIRATORY NEGATIVE: 1
NEUROLOGICAL NEGATIVE: 1
LOSS OF SENSATION IN FEET: 0
MUSCULOSKELETAL NEGATIVE: 1
DEPRESSION: 0
CONSTITUTIONAL NEGATIVE: 1
GASTROINTESTINAL NEGATIVE: 1

## 2025-06-11 ASSESSMENT — PAIN SCALES - GENERAL: PAINLEVEL_OUTOF10: 0-NO PAIN

## 2025-06-11 NOTE — PROGRESS NOTES
"Chief Complaint:   Follow-up    History Of Present Illness:    .Mr Carrasco returns in follow up from hospital stay.  Denies chest pain, sob, palpitations or pedal edema.              Last Recorded Vitals:  Blood pressure 121/78, pulse 85, height 1.753 m (5' 9\"), weight 105 kg (230 lb 14.4 oz), SpO2 97%.     Past Medical History:  Medical History[1]     Past Surgical History:  Surgical History[2]    Social History:  Social History[3]    Family History:  Family History[4]      Allergies:  Ibuprofen    Outpatient Medications:  Current Medications[5]     Physical Exam:  Cardiovascular:      PMI at left midclavicular line. Normal rate. Regular rhythm. Normal S1. Normal S2.       Murmurs: There is no murmur.      No gallop.  No click. No rub.   Pulses:     Intact distal pulses.   Edema:     Peripheral edema absent.       ROS:  Review of Systems   Constitutional: Negative.   Cardiovascular: Negative.    Respiratory: Negative.     Skin: Negative.    Musculoskeletal: Negative.    Gastrointestinal: Negative.    Genitourinary: Negative.    Neurological: Negative.           Last Labs: reviewed  CBC -  Lab Results   Component Value Date    WBC 8.5 11/08/2024    HGB 15.3 11/08/2024    HCT 49.5 11/08/2024    MCV 82 11/08/2024     11/08/2024       CMP -  Lab Results   Component Value Date    CALCIUM 9.6 11/08/2024    PROT 7.7 11/08/2024    ALBUMIN 4.5 11/08/2024    AST 16 11/08/2024    ALT 19 11/08/2024    ALKPHOS 57 11/08/2024    BILITOT 0.6 11/08/2024       LIPID PANEL -   Lab Results   Component Value Date    CHOL 217 (H) 09/18/2023    TRIG 238 (H) 06/14/2023    HDL 37.1 (A) 09/18/2023    CHHDL 5.8 (A) 09/18/2023    LDLF 121 (H) 06/14/2023    VLDL 48 (H) 06/14/2023    NHDL 168 06/14/2023       RENAL FUNCTION PANEL -   Lab Results   Component Value Date    GLUCOSE 113 (H) 11/08/2024     11/08/2024    K 4.1 11/08/2024     11/08/2024    CO2 30 11/08/2024    ANIONGAP 13 11/08/2024    BUN 17 11/08/2024    CREATININE " 1.29 11/08/2024    GFRMALE 78 09/18/2023    CALCIUM 9.6 11/08/2024    ALBUMIN 4.5 11/08/2024        Lab Results   Component Value Date    HGBA1C 6.2 (A) 09/18/2023    HGBA1C 6.4 (H) 05/10/2022         Assessment/Plan   Problem List Items Addressed This Visit    None    1. 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 He is done well on warfarin thus we will continue this.  Given his failure with DOAC in the past, he should not be resumed on that.   2.  CAD.  Patient had coronary artery score of only zero when done 2023.  Echo 2023  CONCLUSIONS:   1. The left ventricular systolic function is normal with a 60-65% estimated ejection fraction.   3.  Lung nodules.  Sees pulmonology.   4.  Hypertension.  Well controlled.   5.  DM.  On metformin.  6.  Tachycardia.  Will start metoprolol succinate 100 mg daily and he would like to return in one year.   7.  Smoking.  Cessation encouraged.    8.  Hyperlipidemia.  Will obtain fasting labs today.  Considering statin.  He did have myalgias on one statin in the past.  Unsure which one.         JAYANT Cavazos-CNP       [1] History reviewed. No pertinent past medical history.  [2] History reviewed. No pertinent surgical history.  [3]   Social History  Socioeconomic History    Marital status:    Tobacco Use    Smoking status: Never     Passive exposure: Never    Smokeless tobacco: Never   Substance and Sexual Activity    Alcohol use: Yes    Drug use: Not Currently     Types: Marijuana    Sexual activity: Yes     Social Drivers of Health     Financial Resource Strain: Low Risk  (9/18/2023)    Overall Financial Resource Strain (CARDIA)     Difficulty of Paying Living Expenses: Not very hard   Food Insecurity: Unknown (4/8/2024)    Received from [facility] Vital Sign     Worried About Running Out of Food in the Last Year: Never true    Transportation Needs: No Transportation Needs (9/18/2023)    PRAPARE - Transportation     Lack of Transportation (Medical): No     Lack of Transportation (Non-Medical): No   Physical Activity: Sufficiently Active (9/18/2023)    Exercise Vital Sign     Days of Exercise per Week: 5 days     Minutes of Exercise per Session: 50 min   Stress: No Stress Concern Present (9/18/2023)    [employer] [place] [employer] - Occupational Stress Questionnaire     Feeling of Stress : Not at all   Social Connections: Unknown (9/18/2023)    Social Connection and Isolation Panel [NHANES]     Frequency of Communication with Friends and Family: More than three times a week     Frequency of Social Gatherings with Friends and Family: More than three times a week     Attends Rastafarian Services: More than 4 times per year     Active Member of Clubs or Organizations: Patient declined     Attends Club or Organization Meetings: Patient declined     Marital Status: Patient declined   Intimate Partner Violence: Not At Risk (9/18/2023)    Humiliation, Afraid, Rape, and Kick questionnaire     Fear of Current or Ex-Partner: No     Emotionally Abused: No     Physically Abused: No     Sexually Abused: No   Housing Stability: Unknown (9/18/2023)    Housing Stability Vital Sign     Unable to Pay for Housing in the Last Year: No     Unstable Housing in the Last Year: No   [4] No family history on file.  [5]   Current Outpatient Medications   Medication Sig Dispense Refill    allopurinol (Zyloprim) 100 mg tablet Take 1 tablet (100 mg) by mouth once daily. 90 tablet 3    amLODIPine (Norvasc) 5 mg tablet TAKE 1 TABLET BY MOUTH EVERY DAY 90 tablet 3    hydroCHLOROthiazide (HYDRODiuril) 25 mg tablet TAKE 1 TABLET BY MOUTH EVERY DAY 90 tablet 3    metFORMIN (Glucophage) 500 mg tablet TAKE 1 TABLET BY MOUTH TWICE A DAY WITH MEALS 180 tablet 2    methocarbamol (Robaxin) 750 mg tablet Take 1 tablet (750 mg) by mouth as needed at bedtime.       metoprolol succinate XL (Toprol-XL) 100 mg 24 hr tablet Take 1 tablet (100 mg) by mouth once daily. Do not crush or chew. 90 tablet 3    multivitamin tablet Take 1 tablet by mouth once daily.      warfarin (Coumadin) 5 mg tablet TAKE AS DIRECTED PER INR RESULTS 1 TO 2 TABS A DAY 90 tablet 3    coenzyme Q-10 200 mg capsule Take 1 capsule (200 mg) by mouth once daily. (Patient not taking: Reported on 6/11/2025)      fluocinolone (DermOtic) 0.01 % ear drops Instill 2 to 3 drops to right ear canal twice daily x 7 days then use as needed for itching/flaking twice daily x 3 days. (Patient not taking: Reported on 6/11/2025) 20 mL 2    pantoprazole (ProtoNix) 40 mg EC tablet Take 1 tablet (40 mg) by mouth once daily in the morning. Take before meals for 14 days. Do not crush, chew, or split. (Patient not taking: Reported on 6/11/2025) 14 tablet 0     No current facility-administered medications for this visit.

## 2025-06-12 DIAGNOSIS — E78.5 HYPERLIPIDEMIA, UNSPECIFIED HYPERLIPIDEMIA TYPE: Primary | ICD-10-CM

## 2025-06-12 LAB
ALBUMIN SERPL-MCNC: 4.5 G/DL (ref 3.6–5.1)
ALP SERPL-CCNC: 62 U/L (ref 36–130)
ALT SERPL-CCNC: 19 U/L (ref 9–46)
ANION GAP SERPL CALCULATED.4IONS-SCNC: 13 MMOL/L (CALC) (ref 7–17)
AST SERPL-CCNC: 16 U/L (ref 10–40)
BILIRUB SERPL-MCNC: 0.7 MG/DL (ref 0.2–1.2)
BUN SERPL-MCNC: 18 MG/DL (ref 7–25)
CALCIUM SERPL-MCNC: 9.3 MG/DL (ref 8.6–10.3)
CHLORIDE SERPL-SCNC: 100 MMOL/L (ref 98–110)
CHOLEST SERPL-MCNC: 228 MG/DL
CHOLEST/HDLC SERPL: 6.3 (CALC)
CO2 SERPL-SCNC: 27 MMOL/L (ref 20–32)
CREAT SERPL-MCNC: 1.13 MG/DL (ref 0.6–1.29)
EGFRCR SERPLBLD CKD-EPI 2021: 81 ML/MIN/1.73M2
ERYTHROCYTE [DISTWIDTH] IN BLOOD BY AUTOMATED COUNT: 14.4 % (ref 11–15)
EST. AVERAGE GLUCOSE BLD GHB EST-MCNC: 260 MG/DL
EST. AVERAGE GLUCOSE BLD GHB EST-SCNC: 14.4 MMOL/L
GLUCOSE SERPL-MCNC: 176 MG/DL (ref 65–99)
HBA1C MFR BLD: 10.7 %
HCT VFR BLD AUTO: 50.2 % (ref 38.5–50)
HDLC SERPL-MCNC: 36 MG/DL
HGB BLD-MCNC: 16.2 G/DL (ref 13.2–17.1)
LDLC SERPL CALC-MCNC: 132 MG/DL (CALC)
MCH RBC QN AUTO: 27.1 PG (ref 27–33)
MCHC RBC AUTO-ENTMCNC: 32.3 G/DL (ref 32–36)
MCV RBC AUTO: 84.1 FL (ref 80–100)
NONHDLC SERPL-MCNC: 192 MG/DL (CALC)
PLATELET # BLD AUTO: 267 THOUSAND/UL (ref 140–400)
PMV BLD REES-ECKER: 9.4 FL (ref 7.5–12.5)
POTASSIUM SERPL-SCNC: 3.9 MMOL/L (ref 3.5–5.3)
PROT SERPL-MCNC: 7.4 G/DL (ref 6.1–8.1)
RBC # BLD AUTO: 5.97 MILLION/UL (ref 4.2–5.8)
SODIUM SERPL-SCNC: 140 MMOL/L (ref 135–146)
TRIGL SERPL-MCNC: 387 MG/DL
TSH SERPL-ACNC: 0.7 MIU/L (ref 0.4–4.5)
WBC # BLD AUTO: 7.9 THOUSAND/UL (ref 3.8–10.8)

## 2025-06-12 RX ORDER — ROSUVASTATIN CALCIUM 40 MG/1
40 TABLET, COATED ORAL DAILY
Qty: 90 TABLET | Refills: 3 | Status: SHIPPED | OUTPATIENT
Start: 2025-06-12 | End: 2026-06-12

## 2025-06-12 NOTE — RESULT ENCOUNTER NOTE
Please call patient with results.  Cholesterol too high.  I have sent for rosuvastain 40 mg daily as discussed yesterday.  Diabetes out of control.  Is he taking the metformin?  Needs to get with pcp or whomever regulates his diabetes to get control.

## 2025-08-14 ENCOUNTER — APPOINTMENT (OUTPATIENT)
Dept: PRIMARY CARE | Facility: CLINIC | Age: 50
End: 2025-08-14
Payer: COMMERCIAL

## 2025-08-14 VITALS
BODY MASS INDEX: 33.77 KG/M2 | OXYGEN SATURATION: 98 % | HEIGHT: 69 IN | TEMPERATURE: 97.7 F | DIASTOLIC BLOOD PRESSURE: 78 MMHG | HEART RATE: 68 BPM | WEIGHT: 228 LBS | SYSTOLIC BLOOD PRESSURE: 117 MMHG

## 2025-08-14 DIAGNOSIS — Z11.59 NEED FOR HEPATITIS C SCREENING TEST: ICD-10-CM

## 2025-08-14 DIAGNOSIS — Z11.4 SCREENING FOR HIV (HUMAN IMMUNODEFICIENCY VIRUS): ICD-10-CM

## 2025-08-14 DIAGNOSIS — D45 POLYCYTHEMIA VERA: ICD-10-CM

## 2025-08-14 DIAGNOSIS — Z12.5 PROSTATE CANCER SCREENING: ICD-10-CM

## 2025-08-14 DIAGNOSIS — R76.0 LUPUS ANTICOAGULANT POSITIVE: ICD-10-CM

## 2025-08-14 DIAGNOSIS — E11.9 TYPE 2 DIABETES MELLITUS WITHOUT COMPLICATION, WITHOUT LONG-TERM CURRENT USE OF INSULIN: Primary | ICD-10-CM

## 2025-08-14 PROCEDURE — 3008F BODY MASS INDEX DOCD: CPT | Performed by: INTERNAL MEDICINE

## 2025-08-14 PROCEDURE — 3078F DIAST BP <80 MM HG: CPT | Performed by: INTERNAL MEDICINE

## 2025-08-14 PROCEDURE — 99214 OFFICE O/P EST MOD 30 MIN: CPT | Performed by: INTERNAL MEDICINE

## 2025-08-14 PROCEDURE — 3074F SYST BP LT 130 MM HG: CPT | Performed by: INTERNAL MEDICINE

## 2025-08-14 ASSESSMENT — PAIN SCALES - GENERAL: PAINLEVEL_OUTOF10: 3

## 2025-08-14 ASSESSMENT — ENCOUNTER SYMPTOMS
SHORTNESS OF BREATH: 0
NAUSEA: 0
ACTIVITY CHANGE: 0
DIARRHEA: 0
SORE THROAT: 0
SINUS PRESSURE: 0
MYALGIAS: 0
CHEST TIGHTNESS: 0
PALPITATIONS: 0
CHILLS: 0
WEAKNESS: 0
DEPRESSION: 0
AGITATION: 0

## 2025-08-14 ASSESSMENT — PATIENT HEALTH QUESTIONNAIRE - PHQ9
SUM OF ALL RESPONSES TO PHQ9 QUESTIONS 1 AND 2: 0
1. LITTLE INTEREST OR PLEASURE IN DOING THINGS: NOT AT ALL
2. FEELING DOWN, DEPRESSED OR HOPELESS: NOT AT ALL

## 2025-08-15 PROBLEM — D45 POLYCYTHEMIA VERA: Status: ACTIVE | Noted: 2025-08-15

## 2025-08-20 ENCOUNTER — TELEMEDICINE (OUTPATIENT)
Dept: PHARMACY | Facility: HOSPITAL | Age: 50
End: 2025-08-20
Payer: COMMERCIAL

## 2025-08-20 DIAGNOSIS — E11.9 TYPE 2 DIABETES MELLITUS WITHOUT COMPLICATION, WITHOUT LONG-TERM CURRENT USE OF INSULIN: ICD-10-CM

## 2025-08-20 RX ORDER — TIRZEPATIDE 2.5 MG/.5ML
2.5 INJECTION, SOLUTION SUBCUTANEOUS WEEKLY
Qty: 2 ML | Refills: 0 | Status: SHIPPED | OUTPATIENT
Start: 2025-08-20

## 2025-09-11 ENCOUNTER — APPOINTMENT (OUTPATIENT)
Dept: PHARMACY | Facility: HOSPITAL | Age: 50
End: 2025-09-11
Payer: COMMERCIAL

## 2025-09-17 ENCOUNTER — APPOINTMENT (OUTPATIENT)
Dept: PHARMACY | Facility: HOSPITAL | Age: 50
End: 2025-09-17
Payer: COMMERCIAL

## 2025-11-11 ENCOUNTER — APPOINTMENT (OUTPATIENT)
Dept: OPHTHALMOLOGY | Facility: CLINIC | Age: 50
End: 2025-11-11
Payer: COMMERCIAL

## 2025-11-13 ENCOUNTER — APPOINTMENT (OUTPATIENT)
Dept: PRIMARY CARE | Facility: CLINIC | Age: 50
End: 2025-11-13
Payer: COMMERCIAL

## 2025-12-10 ENCOUNTER — APPOINTMENT (OUTPATIENT)
Dept: ENDOCRINOLOGY | Facility: CLINIC | Age: 50
End: 2025-12-10
Payer: COMMERCIAL